# Patient Record
Sex: MALE | Race: WHITE | Employment: OTHER | URBAN - METROPOLITAN AREA
[De-identification: names, ages, dates, MRNs, and addresses within clinical notes are randomized per-mention and may not be internally consistent; named-entity substitution may affect disease eponyms.]

---

## 2017-06-29 PROBLEM — G40.309 GENERALIZED SEIZURE DISORDER (HCC): Status: ACTIVE | Noted: 2017-06-29

## 2017-09-27 PROBLEM — Z86.73 HX-TIA (TRANSIENT ISCHEMIC ATTACK): Status: ACTIVE | Noted: 2017-09-27

## 2017-09-27 PROBLEM — J44.9 COPD (CHRONIC OBSTRUCTIVE PULMONARY DISEASE) (HCC): Status: ACTIVE | Noted: 2017-09-27

## 2017-09-28 PROBLEM — J44.1 CHRONIC OBSTRUCTIVE PULMONARY DISEASE WITH ACUTE EXACERBATION (HCC): Status: ACTIVE | Noted: 2017-09-27

## 2018-05-14 ENCOUNTER — HOSPITAL ENCOUNTER (EMERGENCY)
Age: 63
Discharge: HOME | End: 2018-05-14
Payer: OTHER GOVERNMENT

## 2018-05-14 ENCOUNTER — APPOINTMENT (OUTPATIENT)
Dept: GENERAL RADIOLOGY | Age: 63
End: 2018-05-14
Payer: OTHER GOVERNMENT

## 2018-05-14 VITALS
HEIGHT: 71 IN | SYSTOLIC BLOOD PRESSURE: 103 MMHG | RESPIRATION RATE: 17 BRPM | WEIGHT: 176 LBS | BODY MASS INDEX: 24.64 KG/M2 | DIASTOLIC BLOOD PRESSURE: 87 MMHG | TEMPERATURE: 98.3 F | OXYGEN SATURATION: 97 % | HEART RATE: 53 BPM

## 2018-05-14 DIAGNOSIS — S16.1XXA CERVICAL STRAIN, ACUTE, INITIAL ENCOUNTER: ICD-10-CM

## 2018-05-14 DIAGNOSIS — M54.2 NECK PAIN: ICD-10-CM

## 2018-05-14 DIAGNOSIS — S46.911A SHOULDER STRAIN, RIGHT, INITIAL ENCOUNTER: Primary | ICD-10-CM

## 2018-05-14 PROCEDURE — 99283 EMERGENCY DEPT VISIT LOW MDM: CPT

## 2018-05-14 PROCEDURE — 73030 X-RAY EXAM OF SHOULDER: CPT

## 2018-05-14 PROCEDURE — 72050 X-RAY EXAM NECK SPINE 4/5VWS: CPT

## 2018-05-14 RX ORDER — KETOROLAC TROMETHAMINE 10 MG/1
10 TABLET, FILM COATED ORAL EVERY 6 HOURS PRN
Qty: 20 TABLET | Refills: 0 | Status: SHIPPED | OUTPATIENT
Start: 2018-05-14 | End: 2020-07-23

## 2018-06-12 ENCOUNTER — HOSPITAL ENCOUNTER (EMERGENCY)
Age: 63
Discharge: HOME | End: 2018-06-12
Attending: EMERGENCY MEDICINE
Payer: OTHER GOVERNMENT

## 2018-06-12 ENCOUNTER — APPOINTMENT (OUTPATIENT)
Dept: CT IMAGING | Age: 63
End: 2018-06-12
Payer: OTHER GOVERNMENT

## 2018-06-12 ENCOUNTER — APPOINTMENT (OUTPATIENT)
Dept: GENERAL RADIOLOGY | Age: 63
End: 2018-06-12
Payer: OTHER GOVERNMENT

## 2018-06-12 VITALS
DIASTOLIC BLOOD PRESSURE: 88 MMHG | BODY MASS INDEX: 25.9 KG/M2 | SYSTOLIC BLOOD PRESSURE: 182 MMHG | OXYGEN SATURATION: 97 % | HEART RATE: 78 BPM | TEMPERATURE: 96.3 F | RESPIRATION RATE: 18 BRPM | WEIGHT: 185 LBS | HEIGHT: 71 IN

## 2018-06-12 DIAGNOSIS — N28.1 KIDNEY CYSTS: ICD-10-CM

## 2018-06-12 DIAGNOSIS — E86.0 DEHYDRATION: ICD-10-CM

## 2018-06-12 DIAGNOSIS — R07.89 MUSCULOSKELETAL CHEST PAIN: ICD-10-CM

## 2018-06-12 DIAGNOSIS — R10.84 GENERALIZED ABDOMINAL PAIN: ICD-10-CM

## 2018-06-12 DIAGNOSIS — R19.7 NAUSEA VOMITING AND DIARRHEA: Primary | ICD-10-CM

## 2018-06-12 DIAGNOSIS — R11.2 NAUSEA VOMITING AND DIARRHEA: Primary | ICD-10-CM

## 2018-06-12 DIAGNOSIS — I10 HYPERTENSION, UNSPECIFIED TYPE: ICD-10-CM

## 2018-06-12 LAB
ALBUMIN SERPL-MCNC: 4.7 G/DL (ref 3.5–5.2)
ALP BLD-CCNC: 96 U/L (ref 40–129)
ALT SERPL-CCNC: 20 U/L (ref 0–40)
ANION GAP SERPL CALCULATED.3IONS-SCNC: 16 MMOL/L (ref 7–16)
AST SERPL-CCNC: 18 U/L (ref 0–39)
BASOPHILS ABSOLUTE: 0.05 E9/L (ref 0–0.2)
BASOPHILS RELATIVE PERCENT: 0.3 % (ref 0–2)
BILIRUB SERPL-MCNC: 0.4 MG/DL (ref 0–1.2)
BUN BLDV-MCNC: 27 MG/DL (ref 8–23)
CALCIUM SERPL-MCNC: 10.3 MG/DL (ref 8.6–10.2)
CHLORIDE BLD-SCNC: 106 MMOL/L (ref 98–107)
CO2: 20 MMOL/L (ref 22–29)
CREAT SERPL-MCNC: 1.1 MG/DL (ref 0.7–1.2)
EOSINOPHILS ABSOLUTE: 0.01 E9/L (ref 0.05–0.5)
EOSINOPHILS RELATIVE PERCENT: 0.1 % (ref 0–6)
GFR AFRICAN AMERICAN: >60
GFR NON-AFRICAN AMERICAN: >60 ML/MIN/1.73
GLUCOSE BLD-MCNC: 129 MG/DL (ref 74–109)
HCT VFR BLD CALC: 51.3 % (ref 37–54)
HEMOGLOBIN: 17.6 G/DL (ref 12.5–16.5)
IMMATURE GRANULOCYTES #: 0.09 E9/L
IMMATURE GRANULOCYTES %: 0.6 % (ref 0–5)
LACTIC ACID: 1.5 MMOL/L (ref 0.5–2.2)
LIPASE: 36 U/L (ref 13–60)
LYMPHOCYTES ABSOLUTE: 1.3 E9/L (ref 1.5–4)
LYMPHOCYTES RELATIVE PERCENT: 8.8 % (ref 20–42)
MCH RBC QN AUTO: 28.9 PG (ref 26–35)
MCHC RBC AUTO-ENTMCNC: 34.3 % (ref 32–34.5)
MCV RBC AUTO: 84.4 FL (ref 80–99.9)
MONOCYTES ABSOLUTE: 0.32 E9/L (ref 0.1–0.95)
MONOCYTES RELATIVE PERCENT: 2.2 % (ref 2–12)
NEUTROPHILS ABSOLUTE: 13.08 E9/L (ref 1.8–7.3)
NEUTROPHILS RELATIVE PERCENT: 88 % (ref 43–80)
PDW BLD-RTO: 13.7 FL (ref 11.5–15)
PLATELET # BLD: 304 E9/L (ref 130–450)
PMV BLD AUTO: 10.5 FL (ref 7–12)
POTASSIUM SERPL-SCNC: 3.8 MMOL/L (ref 3.5–5)
RBC # BLD: 6.08 E12/L (ref 3.8–5.8)
SODIUM BLD-SCNC: 142 MMOL/L (ref 132–146)
TOTAL PROTEIN: 7.5 G/DL (ref 6.4–8.3)
TROPONIN: <0.01 NG/ML (ref 0–0.03)
WBC # BLD: 14.9 E9/L (ref 4.5–11.5)

## 2018-06-12 PROCEDURE — 74177 CT ABD & PELVIS W/CONTRAST: CPT

## 2018-06-12 PROCEDURE — 85025 COMPLETE CBC W/AUTO DIFF WBC: CPT

## 2018-06-12 PROCEDURE — 83605 ASSAY OF LACTIC ACID: CPT

## 2018-06-12 PROCEDURE — 99285 EMERGENCY DEPT VISIT HI MDM: CPT

## 2018-06-12 PROCEDURE — 83690 ASSAY OF LIPASE: CPT

## 2018-06-12 PROCEDURE — 2580000003 HC RX 258: Performed by: RADIOLOGY

## 2018-06-12 PROCEDURE — 96375 TX/PRO/DX INJ NEW DRUG ADDON: CPT

## 2018-06-12 PROCEDURE — 96361 HYDRATE IV INFUSION ADD-ON: CPT

## 2018-06-12 PROCEDURE — 96374 THER/PROPH/DIAG INJ IV PUSH: CPT

## 2018-06-12 PROCEDURE — 6360000002 HC RX W HCPCS: Performed by: PHYSICIAN ASSISTANT

## 2018-06-12 PROCEDURE — 84484 ASSAY OF TROPONIN QUANT: CPT

## 2018-06-12 PROCEDURE — 80053 COMPREHEN METABOLIC PANEL: CPT

## 2018-06-12 PROCEDURE — 6360000004 HC RX CONTRAST MEDICATION: Performed by: RADIOLOGY

## 2018-06-12 PROCEDURE — 2580000003 HC RX 258: Performed by: PHYSICIAN ASSISTANT

## 2018-06-12 PROCEDURE — 71045 X-RAY EXAM CHEST 1 VIEW: CPT

## 2018-06-12 PROCEDURE — 36415 COLL VENOUS BLD VENIPUNCTURE: CPT

## 2018-06-12 RX ORDER — 0.9 % SODIUM CHLORIDE 0.9 %
1000 INTRAVENOUS SOLUTION INTRAVENOUS ONCE
Status: COMPLETED | OUTPATIENT
Start: 2018-06-12 | End: 2018-06-12

## 2018-06-12 RX ORDER — METOCLOPRAMIDE HYDROCHLORIDE 5 MG/ML
10 INJECTION INTRAMUSCULAR; INTRAVENOUS ONCE
Status: COMPLETED | OUTPATIENT
Start: 2018-06-12 | End: 2018-06-12

## 2018-06-12 RX ORDER — DICYCLOMINE HYDROCHLORIDE 10 MG/1
20 CAPSULE ORAL
Qty: 56 CAPSULE | Refills: 0 | Status: SHIPPED | OUTPATIENT
Start: 2018-06-12 | End: 2020-07-23

## 2018-06-12 RX ORDER — SODIUM CHLORIDE 0.9 % (FLUSH) 0.9 %
10 SYRINGE (ML) INJECTION
Status: COMPLETED | OUTPATIENT
Start: 2018-06-12 | End: 2018-06-12

## 2018-06-12 RX ORDER — ONDANSETRON 4 MG/1
4 TABLET, FILM COATED ORAL EVERY 8 HOURS PRN
Qty: 12 TABLET | Refills: 0 | Status: SHIPPED | OUTPATIENT
Start: 2018-06-12 | End: 2018-06-17

## 2018-06-12 RX ORDER — MORPHINE SULFATE 4 MG/ML
4 INJECTION, SOLUTION INTRAMUSCULAR; INTRAVENOUS ONCE
Status: COMPLETED | OUTPATIENT
Start: 2018-06-12 | End: 2018-06-12

## 2018-06-12 RX ORDER — FENTANYL CITRATE 50 UG/ML
25 INJECTION, SOLUTION INTRAMUSCULAR; INTRAVENOUS ONCE
Status: COMPLETED | OUTPATIENT
Start: 2018-06-12 | End: 2018-06-12

## 2018-06-12 RX ADMIN — IOPAMIDOL 110 ML: 755 INJECTION, SOLUTION INTRAVENOUS at 13:29

## 2018-06-12 RX ADMIN — FENTANYL CITRATE 25 MCG: 50 INJECTION, SOLUTION INTRAMUSCULAR; INTRAVENOUS at 14:30

## 2018-06-12 RX ADMIN — MORPHINE SULFATE 4 MG: 4 INJECTION INTRAVENOUS at 12:01

## 2018-06-12 RX ADMIN — METOCLOPRAMIDE 10 MG: 5 INJECTION, SOLUTION INTRAMUSCULAR; INTRAVENOUS at 12:01

## 2018-06-12 RX ADMIN — SODIUM CHLORIDE 1000 ML: 9 INJECTION, SOLUTION INTRAVENOUS at 12:02

## 2018-06-12 RX ADMIN — Medication 10 ML: at 13:29

## 2018-06-12 ASSESSMENT — PAIN SCALES - WONG BAKER: WONGBAKER_NUMERICALRESPONSE: 2

## 2018-06-12 ASSESSMENT — PAIN DESCRIPTION - FREQUENCY
FREQUENCY: CONTINUOUS
FREQUENCY: CONTINUOUS

## 2018-06-12 ASSESSMENT — PAIN DESCRIPTION - ONSET
ONSET: SUDDEN
ONSET: SUDDEN

## 2018-06-12 ASSESSMENT — PAIN DESCRIPTION - DESCRIPTORS
DESCRIPTORS: ACHING;BURNING;SHARP;SHOOTING;SORE
DESCRIPTORS: ACHING;SHARP

## 2018-06-12 ASSESSMENT — PAIN DESCRIPTION - PAIN TYPE
TYPE: ACUTE PAIN
TYPE: ACUTE PAIN

## 2018-06-12 ASSESSMENT — PAIN DESCRIPTION - LOCATION
LOCATION: CHEST;ABDOMEN
LOCATION: ABDOMEN

## 2018-06-12 ASSESSMENT — PAIN SCALES - GENERAL
PAINLEVEL_OUTOF10: 8
PAINLEVEL_OUTOF10: 2
PAINLEVEL_OUTOF10: 8
PAINLEVEL_OUTOF10: 6
PAINLEVEL_OUTOF10: 7

## 2018-06-12 ASSESSMENT — PAIN DESCRIPTION - PROGRESSION: CLINICAL_PROGRESSION: NOT CHANGED

## 2018-06-16 LAB
EKG ATRIAL RATE: 72 BPM
EKG P AXIS: 60 DEGREES
EKG P-R INTERVAL: 152 MS
EKG Q-T INTERVAL: 428 MS
EKG QRS DURATION: 106 MS
EKG QTC CALCULATION (BAZETT): 468 MS
EKG R AXIS: 87 DEGREES
EKG T AXIS: 47 DEGREES
EKG VENTRICULAR RATE: 72 BPM

## 2018-09-23 ENCOUNTER — HOSPITAL ENCOUNTER (EMERGENCY)
Age: 63
Discharge: HOME | End: 2018-09-23
Payer: OTHER GOVERNMENT

## 2018-09-23 VITALS
OXYGEN SATURATION: 99 % | HEIGHT: 71 IN | DIASTOLIC BLOOD PRESSURE: 74 MMHG | TEMPERATURE: 97.3 F | RESPIRATION RATE: 16 BRPM | HEART RATE: 87 BPM | WEIGHT: 185 LBS | SYSTOLIC BLOOD PRESSURE: 118 MMHG | BODY MASS INDEX: 25.9 KG/M2

## 2018-09-23 DIAGNOSIS — M54.42 ACUTE LEFT-SIDED LOW BACK PAIN WITH LEFT-SIDED SCIATICA: Primary | ICD-10-CM

## 2018-09-23 PROCEDURE — 6360000002 HC RX W HCPCS: Performed by: PHYSICIAN ASSISTANT

## 2018-09-23 PROCEDURE — 99282 EMERGENCY DEPT VISIT SF MDM: CPT

## 2018-09-23 PROCEDURE — 96372 THER/PROPH/DIAG INJ SC/IM: CPT

## 2018-09-23 RX ORDER — KETOROLAC TROMETHAMINE 30 MG/ML
30 INJECTION, SOLUTION INTRAMUSCULAR; INTRAVENOUS ONCE
Status: COMPLETED | OUTPATIENT
Start: 2018-09-23 | End: 2018-09-23

## 2018-09-23 RX ORDER — ORPHENADRINE CITRATE 30 MG/ML
60 INJECTION INTRAMUSCULAR; INTRAVENOUS ONCE
Status: COMPLETED | OUTPATIENT
Start: 2018-09-23 | End: 2018-09-23

## 2018-09-23 RX ORDER — LIDOCAINE 50 MG/G
1 PATCH TOPICAL EVERY 24 HOURS
Qty: 10 PATCH | Refills: 0 | Status: SHIPPED | OUTPATIENT
Start: 2018-09-23 | End: 2018-10-03

## 2018-09-23 RX ORDER — PREDNISONE 10 MG/1
40 TABLET ORAL DAILY
Qty: 20 TABLET | Refills: 0 | Status: SHIPPED | OUTPATIENT
Start: 2018-09-23 | End: 2018-09-28

## 2018-09-23 RX ADMIN — KETOROLAC TROMETHAMINE 30 MG: 30 INJECTION, SOLUTION INTRAMUSCULAR at 12:13

## 2018-09-23 RX ADMIN — ORPHENADRINE CITRATE 60 MG: 30 INJECTION INTRAMUSCULAR; INTRAVENOUS at 12:13

## 2018-09-23 ASSESSMENT — PAIN DESCRIPTION - DESCRIPTORS: DESCRIPTORS: ACHING

## 2018-09-23 ASSESSMENT — PAIN DESCRIPTION - FREQUENCY: FREQUENCY: CONTINUOUS

## 2018-09-23 ASSESSMENT — PAIN DESCRIPTION - PAIN TYPE: TYPE: CHRONIC PAIN

## 2018-09-23 ASSESSMENT — PAIN DESCRIPTION - PROGRESSION: CLINICAL_PROGRESSION: GRADUALLY WORSENING

## 2018-09-23 ASSESSMENT — PAIN SCALES - GENERAL: PAINLEVEL_OUTOF10: 10

## 2018-09-23 ASSESSMENT — PAIN DESCRIPTION - LOCATION: LOCATION: BACK

## 2018-09-23 NOTE — ED PROVIDER NOTES
drink alcohol or use drugs. Family History: family history includes Seizures in his son; Substance Abuse in his father. Allergies: Patient has no known allergies. Physical Exam           ED Triage Vitals [09/23/18 1115]   BP Temp Temp Source Pulse Resp SpO2 Height Weight   123/75 97.5 °F (36.4 °C) Temporal 89 18 96 % 5' 11\" (1.803 m) 185 lb (83.9 kg)      Oxygen Saturation Interpretation: Normal.    Constitutional:  Alert, development consistent with age. HEENT:  NC/NT. Airway patent. Neck:  Normal ROM. Supple. Respiratory:  Clear to auscultation and breath sounds equal.  CV:  Regular rate and rhythm, normal heart sounds, without pathological murmurs, ectopy, gallops, or rubs. GI:  Abdomen Soft, nontender, good bowel sounds. No firm or pulsatile mass. Back: lower lumbar spine left sided and midline. Tenderness: Moderate. Swelling: no.              Range of Motion: full range with pain. CVA Tenderness: No.            Straight leg raising:  Bilateral negative. Skin:  no erythema, rash or swelling noted. Distal Function:              Motor deficit: none. Sensory deficit: none. Pulse deficit: none. 2+ dorsalis pedis pulses b/l. Calf Tenderness:  No Bilateral.               Edema:  none Both lower extremity(s). Reflexes: Bilateral patellar normal.  Gait:  normal.  Integument:  Normal turgor. Warm, dry, without visible rash. Lymphatics: No lymphangitis or adenopathy noted. Neurological:  Oriented. Motor functions intact. Lab / Imaging Results   (All laboratory and radiology results have been personally reviewed by myself)  Labs:  No results found for this visit on 09/23/18. Imaging: All Radiology results interpreted by Radiologist unless otherwise noted.   No orders to display       ED Course / Medical Decision Making     Medications   ketorolac (TORADOL) injection 30 mg (30 mg Intramuscular Given 9/23/18 1213)   orphenadrine (NORFLEX) injection 60 mg (60 mg Intramuscular Given 9/23/18 1213)        Re-examination:  9/23/18       Time: 1310    Patients symptoms are improving. Reports pain has improved. Consult(s):   None    Procedure(s):   none    Medical Decision Making:    Patient denies any loss of control of his bowels or his bladder, no saddle anesthesia, no Senokot equine's syndrome. He denies any new fall or injury. He states that he has had back pain like this multiple times in the past. Imaging not done at this time. He has good peripheral pulses in his feet. He denies IV drug abuse, fevers, chills, dysuria. No red flag symptoms. Patient reports that this is chronic back pain for him. He did get some relief with the medications given to him here. He is only on gabapentin at home. We'll discharge him at this time. Advised patient to return to the ER for any worsening symptoms. Otherwise to follow-up with PCP. Counseling: The emergency provider has spoken with the patient and discussed todays results, in addition to providing specific details for the plan of care and counseling regarding the diagnosis and prognosis. Questions are answered at this time and they are agreeable with the plan. Assessment      1. Acute left-sided low back pain with left-sided sciatica      Plan   Discharge to home  Patient condition is good    New Medications     Discharge Medication List as of 9/23/2018  1:07 PM      START taking these medications    Details   lidocaine (LIDODERM) 5 % Place 1 patch onto the skin every 24 hours for 10 days 12 hours on, 12 hours off., Disp-10 patch, R-0Print           Electronically signed by SOTO Arias   DD: 9/23/18  **This report was transcribed using voice recognition software. Every effort was made to ensure accuracy; however, inadvertent computerized transcription errors may be present.   END OF ED PROVIDER NOTE       Rosita Aguilar, [address]  09/23/18 0064

## 2018-10-06 ENCOUNTER — HOSPITAL ENCOUNTER (EMERGENCY)
Age: 63
Discharge: HOME | End: 2018-10-06
Payer: OTHER GOVERNMENT

## 2018-10-06 ENCOUNTER — APPOINTMENT (OUTPATIENT)
Dept: GENERAL RADIOLOGY | Age: 63
End: 2018-10-06
Payer: OTHER GOVERNMENT

## 2018-10-06 VITALS
HEART RATE: 89 BPM | WEIGHT: 173 LBS | OXYGEN SATURATION: 97 % | BODY MASS INDEX: 24.22 KG/M2 | DIASTOLIC BLOOD PRESSURE: 95 MMHG | TEMPERATURE: 96.6 F | SYSTOLIC BLOOD PRESSURE: 154 MMHG | RESPIRATION RATE: 17 BRPM | HEIGHT: 71 IN

## 2018-10-06 DIAGNOSIS — M17.12 ARTHRITIS OF KNEE, LEFT: Primary | ICD-10-CM

## 2018-10-06 PROCEDURE — 96372 THER/PROPH/DIAG INJ SC/IM: CPT

## 2018-10-06 PROCEDURE — 99283 EMERGENCY DEPT VISIT LOW MDM: CPT

## 2018-10-06 PROCEDURE — 73564 X-RAY EXAM KNEE 4 OR MORE: CPT

## 2018-10-06 PROCEDURE — 6360000002 HC RX W HCPCS: Performed by: NURSE PRACTITIONER

## 2018-10-06 RX ORDER — IBUPROFEN 800 MG/1
800 TABLET ORAL EVERY 8 HOURS PRN
Qty: 21 TABLET | Refills: 0 | Status: SHIPPED | OUTPATIENT
Start: 2018-10-06 | End: 2020-07-23

## 2018-10-06 RX ORDER — KETOROLAC TROMETHAMINE 30 MG/ML
30 INJECTION, SOLUTION INTRAMUSCULAR; INTRAVENOUS ONCE
Status: COMPLETED | OUTPATIENT
Start: 2018-10-06 | End: 2018-10-06

## 2018-10-06 RX ORDER — PREDNISONE 10 MG/1
40 TABLET ORAL DAILY
Qty: 20 TABLET | Refills: 0 | Status: SHIPPED | OUTPATIENT
Start: 2018-10-06 | End: 2018-10-11

## 2018-10-06 RX ADMIN — KETOROLAC TROMETHAMINE 30 MG: 30 INJECTION, SOLUTION INTRAMUSCULAR at 11:25

## 2018-10-06 ASSESSMENT — PAIN SCALES - GENERAL: PAINLEVEL_OUTOF10: 10

## 2018-10-06 NOTE — ED PROVIDER NOTES
[facility]    Department of Emergency Medicine   ED  Provider Note  Admit Date/RoomTime: 10/6/2018  9:31 AM  ED Room: 37/37  Chief Complaint   Knee Pain (left knee pain x 1 week, thought it was arthiritis, states he had XR yesterday at the [place])    History of Present Illness   Source of history provided by:  patient. History/Exam Limitations: none. Chelsie Phillips is a 61 y.o. old male who has a past medical history of:   Past Medical History:   Diagnosis Date    Back pain, chronic     Brain tumor ([address] unk    Chest pain 9/16/2014    Herniated disc     Marijuana use 9/16/2014    Seizure disorder (HCC)     Seizures (HCC)     Tobacco abuse     Tobacco abuse 9/16/2014    Unspecified cerebral artery occlusion with cerebral infarction     presents to the emergency department by private vehicle, for generalized aching  to left knee  which occured a few day(s) prior to arrival.  The complaint occurred as a result of no trauma  while at home. There has been a history of no prior problems with this area in the past.  Since onset the symptoms have been moderate in degree. His pain is aggraveated by movement and use and relieved by nothing. Tetanus Status: unknown. His weight bearing ability:  Normal. Patient states that he has had several month history of mild pain but states it has been worse the last couple of days. He states he had imaging at the [place] yesterday but has not had results and states he needs to know what is going on. ROS    Pertinent positives and negatives are stated within HPI, all other systems reviewed and are negative. Past Surgical History:   Procedure Laterality Date    BRAIN SURGERY      CERVICAL FUSION      EXTERNAL EAR SURGERY      EYE SURGERY Bilateral     TONSILLECTOMY     Social History:  reports that he has been smoking Cigarettes. He has a 40.00 pack-year smoking history.  He has never used smokeless tobacco. He reports that he does not drink alcohol or use

## 2018-10-08 ENCOUNTER — HOSPITAL ENCOUNTER (EMERGENCY)
Age: 63
Discharge: HOME | End: 2018-10-08
Payer: OTHER GOVERNMENT

## 2018-10-08 VITALS
HEART RATE: 77 BPM | DIASTOLIC BLOOD PRESSURE: 80 MMHG | WEIGHT: 173 LBS | SYSTOLIC BLOOD PRESSURE: 136 MMHG | BODY MASS INDEX: 24.22 KG/M2 | HEIGHT: 71 IN | RESPIRATION RATE: 14 BRPM | TEMPERATURE: 96.8 F | OXYGEN SATURATION: 98 %

## 2018-10-08 DIAGNOSIS — M25.562 ACUTE PAIN OF LEFT KNEE: Primary | ICD-10-CM

## 2018-10-08 PROCEDURE — 6360000002 HC RX W HCPCS: Performed by: NURSE PRACTITIONER

## 2018-10-08 PROCEDURE — 99282 EMERGENCY DEPT VISIT SF MDM: CPT

## 2018-10-08 PROCEDURE — 6370000000 HC RX 637 (ALT 250 FOR IP): Performed by: NURSE PRACTITIONER

## 2018-10-08 PROCEDURE — 96372 THER/PROPH/DIAG INJ SC/IM: CPT

## 2018-10-08 RX ORDER — HYDROCODONE BITARTRATE AND ACETAMINOPHEN 5; 325 MG/1; MG/1
1 TABLET ORAL ONCE
Status: COMPLETED | OUTPATIENT
Start: 2018-10-08 | End: 2018-10-08

## 2018-10-08 RX ORDER — KETOROLAC TROMETHAMINE 30 MG/ML
60 INJECTION, SOLUTION INTRAMUSCULAR; INTRAVENOUS ONCE
Status: COMPLETED | OUTPATIENT
Start: 2018-10-08 | End: 2018-10-08

## 2018-10-08 RX ADMIN — HYDROCODONE BITARTRATE AND ACETAMINOPHEN 1 TABLET: 5; 325 TABLET ORAL at 12:40

## 2018-10-08 RX ADMIN — KETOROLAC TROMETHAMINE 60 MG: 30 INJECTION, SOLUTION INTRAMUSCULAR at 12:40

## 2018-10-08 ASSESSMENT — PAIN DESCRIPTION - DESCRIPTORS: DESCRIPTORS: ACHING

## 2018-10-08 ASSESSMENT — PAIN DESCRIPTION - PAIN TYPE: TYPE: CHRONIC PAIN;ACUTE PAIN

## 2018-10-08 ASSESSMENT — PAIN DESCRIPTION - LOCATION: LOCATION: KNEE

## 2018-10-08 ASSESSMENT — PAIN DESCRIPTION - FREQUENCY: FREQUENCY: CONTINUOUS

## 2018-10-08 ASSESSMENT — PAIN DESCRIPTION - ORIENTATION: ORIENTATION: LEFT

## 2018-10-08 ASSESSMENT — PAIN SCALES - GENERAL
PAINLEVEL_OUTOF10: 10
PAINLEVEL_OUTOF10: 10

## 2018-10-08 ASSESSMENT — PAIN DESCRIPTION - PROGRESSION: CLINICAL_PROGRESSION: GRADUALLY WORSENING

## 2018-10-11 NOTE — ED PROVIDER NOTES
Consult(s):   None    Procedure(s):   none. Medical Decision Making:    Films were not obtained based on negative suspicion for new bony injury as per history/physical findings, no erythema or warmth to suggest septic joint. Plan is subsequently for symptom control, limited use as tolerated and  immobilization [facility] Counseling: The emergency provider has spoken with the patient and spouse/SO and discussed todays results, in addition to providing specific details for the plan of care and counseling regarding the diagnosis and prognosis. Questions are answered at this time and they are agreeable with the plan. Assessment      1. Acute pain of left knee      Plan   Discharge to home  Patient condition is good    New Medications     Discharge Medication List as of 10/8/2018 12:11 PM        Electronically signed by SAI Kerr CNP   DD: 10/11/18  **This report was transcribed using voice recognition software. Every effort was made to ensure accuracy; however, inadvertent computerized transcription errors may be present.   END OF ED PROVIDER NOTE      SAI Oglesby CNP  10/11/18 [id number]

## 2019-01-01 ENCOUNTER — APPOINTMENT (OUTPATIENT)
Dept: GENERAL RADIOLOGY | Age: 64
End: 2019-01-01
Payer: OTHER GOVERNMENT

## 2019-01-01 ENCOUNTER — HOSPITAL ENCOUNTER (EMERGENCY)
Age: 64
Discharge: HOME | End: 2019-01-01
Payer: OTHER GOVERNMENT

## 2019-01-01 VITALS
HEIGHT: 71 IN | OXYGEN SATURATION: 96 % | BODY MASS INDEX: 24.22 KG/M2 | WEIGHT: 173 LBS | RESPIRATION RATE: 16 BRPM | HEART RATE: 93 BPM | TEMPERATURE: 97.8 F

## 2019-01-01 DIAGNOSIS — M25.551 RIGHT HIP PAIN: ICD-10-CM

## 2019-01-01 DIAGNOSIS — M25.511 ACUTE PAIN OF RIGHT SHOULDER: ICD-10-CM
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
## 2019-01-01 PROCEDURE — 73502 X-RAY EXAM HIP UNI 2-3 VIEWS: CPT

## 2019-01-01 PROCEDURE — 6370000000 HC RX 637 (ALT 250 FOR IP): Performed by: PHYSICIAN ASSISTANT

## 2019-01-01 PROCEDURE — 99283 EMERGENCY DEPT VISIT LOW MDM: CPT

## 2019-01-01 PROCEDURE — 73060 X-RAY EXAM OF HUMERUS: CPT

## 2019-01-01 PROCEDURE — 73030 X-RAY EXAM OF SHOULDER: CPT

## 2019-01-01 RX ORDER — HYDROCODONE BITARTRATE AND ACETAMINOPHEN 5; 325 MG/1; MG/1
1 TABLET ORAL ONCE
Status: COMPLETED | OUTPATIENT
Start: 2019-01-01 | End: 2019-01-01

## 2019-01-01 RX ORDER — HYDROCODONE BITARTRATE AND ACETAMINOPHEN 5; 325 MG/1; MG/1
1 TABLET ORAL EVERY 6 HOURS PRN
Qty: 12 TABLET | Refills: 0 | Status: SHIPPED | OUTPATIENT
Start: 2019-01-01 | End: 2019-01-04

## 2019-01-01 RX ADMIN — HYDROCODONE BITARTRATE AND ACETAMINOPHEN 1 TABLET: 5; 325 TABLET ORAL at 11:46

## 2019-01-01 ASSESSMENT — PAIN DESCRIPTION - LOCATION: LOCATION: SHOULDER

## 2019-01-01 ASSESSMENT — PAIN DESCRIPTION - PAIN TYPE: TYPE: ACUTE PAIN

## 2019-01-01 ASSESSMENT — PAIN SCALES - GENERAL: PAINLEVEL_OUTOF10: 9

## 2019-01-01 ASSESSMENT — PAIN DESCRIPTION - DESCRIPTORS: DESCRIPTORS: ACHING

## 2019-01-01 ASSESSMENT — PAIN DESCRIPTION - ORIENTATION: ORIENTATION: RIGHT

## 2019-04-09 ENCOUNTER — HOSPITAL ENCOUNTER (EMERGENCY)
Age: 64
Discharge: HOME | End: 2019-04-09
Attending: EMERGENCY MEDICINE
Payer: OTHER GOVERNMENT

## 2019-04-09 VITALS
DIASTOLIC BLOOD PRESSURE: 90 MMHG | WEIGHT: 173 LBS | TEMPERATURE: 98.8 F | OXYGEN SATURATION: 96 % | BODY MASS INDEX: 24.13 KG/M2 | RESPIRATION RATE: 18 BRPM | HEART RATE: 95 BPM | SYSTOLIC BLOOD PRESSURE: 154 MMHG

## 2019-04-09 DIAGNOSIS — M25.519 SHOULDER PAIN, UNSPECIFIED CHRONICITY, UNSPECIFIED LATERALITY: ICD-10-CM

## 2019-04-09 DIAGNOSIS — G89.18 POSTOPERATIVE PAIN: Primary | ICD-10-CM

## 2019-04-09 PROCEDURE — 99282 EMERGENCY DEPT VISIT SF MDM: CPT

## 2019-04-09 RX ORDER — OXYCODONE HYDROCHLORIDE AND ACETAMINOPHEN 5; 325 MG/1; MG/1
1 TABLET ORAL EVERY 6 HOURS PRN
Qty: 12 TABLET | Refills: 0 | Status: SHIPPED | OUTPATIENT
Start: 2019-04-09 | End: 2019-04-12

## 2019-04-09 ASSESSMENT — PAIN SCALES - GENERAL: PAINLEVEL_OUTOF10: 9

## 2019-04-09 ASSESSMENT — PAIN DESCRIPTION - LOCATION: LOCATION: SHOULDER

## 2019-04-09 ASSESSMENT — PAIN DESCRIPTION - ORIENTATION: ORIENTATION: RIGHT

## 2019-04-09 NOTE — ED PROVIDER NOTES
ED Attending  CC: Chari         Department of Emergency Medicine   ED  Provider Note  Admit Date/RoomTime: 4/9/2019 12:44 PM  ED Room: [place] [address]/  Chief Complaint:   Shoulder Pain (had right rotator cuff surgery on 3/28 at [facility], increasing pain; denies new injury)    History of Present Illness   Source of history provided by:  patient. History/Exam Limitations: none. Jyoti Mathews is a 61 y.o. old male who has a past medical history of:   Past Medical History:   Diagnosis Date    Back pain, chronic     Brain tumor ([address] unk    Chest pain 9/16/2014    Herniated disc     Marijuana use 9/16/2014    Seizure disorder (HCC)     Seizures (HCC)     Tobacco abuse     Tobacco abuse 9/16/2014    Unspecified cerebral artery occlusion with cerebral infarction    presents to the emergency department for c/o R shoulder pain. Reports having R rotator cuff surgery with repair of a biceps tear on 3/28/2019 at [facility]. He reports that he ran out of his percocet 3-4 days ago, he was then ordered Tramadol but he is unable to take as it raises his BP. Describes the pain as a burning sensation and \"spasm\" feeling. Denies injury/trauma. Denies fever, chills, chest pain, SOB, palpitations, lightheadedness, dizziness, syncope, headache, extremity numbness, nausea, vomiting, or diarrhea. He reports that they have been doing dressing changes at home and have not seen any issues which included no redness, swelling or drainage. He is right handed. The patients tetanus status is up to date. Since onset the symptoms have been moderate in degree. His pain is aggraveated by nothing and relieved by nothing. ROS    Pertinent positives and negatives are stated within HPI, all other systems reviewed and are negative. Past Surgical History:  has a past surgical history that includes Eye surgery (Bilateral); Tonsillectomy; External ear surgery; cervical fusion; and brain surgery.   Social History:  reports that he has been smoking cigarettes. He has a 40.00 pack-year smoking history. He has never used smokeless tobacco. He reports that he does not drink alcohol or use drugs. Family History: family history includes Seizures in his son; Substance Abuse in his father. Allergies: Tramadol    Physical Exam           ED Triage Vitals   BP Temp Temp src Pulse Resp SpO2 Height Weight   04/09/19 1242 04/09/19 1242 -- 04/09/19 1150 04/09/19 1242 04/09/19 1150 -- 04/09/19 1242   (!) 154/90 98.8 °F (37.1 °C)  107 18 96 %  173 lb (78.5 kg)      Oxygen Saturation Interpretation: Normal    Constitutional:  Alert, development consistent with age. Neck:  Normal ROM. Supple. Non-tender. Shoulder:  Right anterior, lateral.              Tenderness: mild              Swelling: Mild. Deformity: no.              ROM: in shoulder immobilizer/sling. Skin:  nR shoulder superior, lateral, incisions clean and dry with steri-strips intact. No erythema or drainage present         Neurovascular: Motor deficit: R shoulder immobilizer/sling intact            Sensory deficit: none. Pulse deficit: none. Capillary refill: normal.    Elbow:              Tenderness:  none. Swelling: None. Deformity: no.              ROM: sling intact. Skin:  no erythema, rash or wounds noted. Lymphatics: No lymphangitis or edema noted  Neurological:  Oriented. Motor functions intact. Lab / Imaging Results   (All laboratory and radiology results have been personally reviewed by myself)  Labs:  No results found for this visit on 04/09/19. Imaging: All Radiology results interpreted by Radiologist unless otherwise noted. No orders to display     ED Course / Medical Decision Making   Medications - No data to display       Consult(s):   None    Procedure(s):   none    MDM:   Oliverio Brownlee is a 61 yr old male who presents to the ED for c/o R shoulder pain.  He is s/p R rotator cuff and biceps repair 3/28/19 at [facility]. He has been with out percocet for that past 4 days. He was prescribed Tramadol but he is unable to take it due to elevated BP effects. Denies any injury. Surgical incisions clean dry steri-strips intact. There is minimal swelling and no drainage or erythema. There have been no fever or chills. He was prescribed percocet for 3 days to be taken judiciously and instructed to follow up with [facility]. Advised to return for any new, changing, worsening symptoms or concerns. Counseling: The emergency provider has spoken with the patient and discussed todays results, in addition to providing specific details for the plan of care and counseling regarding the diagnosis and prognosis. Questions are answered at this time and they are agreeable with the plan. Assessment      1. Postoperative pain    2. Shoulder pain, unspecified chronicity, unspecified laterality      Plan   Discharge to home  Patient condition is good    New Medications     Discharge Medication List as of 4/9/2019  1:36 PM      START taking these medications    Details   oxyCODONE-acetaminophen (PERCOCET) 5-325 MG per tablet Take 1 tablet by mouth every 6 hours as needed for Pain for up to 3 days. Intended supply: 3 days. Take lowest dose possible to manage pain, Disp-12 tablet, R-0Print           Electronically signed by SAI Zayas CNP   DD: 4/9/19  **This report was transcribed using voice recognition software. Every effort was made to ensure accuracy; however, inadvertent computerized transcription errors may be present.   END OF ED PROVIDER NOTE        SAI Zayas CNP  04/10/19 [id number]

## 2020-07-23 ENCOUNTER — HOSPITAL ENCOUNTER (INPATIENT)
Age: 65
LOS: 1 days | Discharge: HOME | DRG: 155 | End: 2020-07-24
Attending: EMERGENCY MEDICINE | Admitting: INTERNAL MEDICINE
Payer: OTHER GOVERNMENT

## 2020-07-23 ENCOUNTER — APPOINTMENT (OUTPATIENT)
Dept: CT IMAGING | Age: 65
DRG: 155 | End: 2020-07-23
Payer: OTHER GOVERNMENT

## 2020-07-23 ENCOUNTER — APPOINTMENT (OUTPATIENT)
Dept: GENERAL RADIOLOGY | Age: 65
DRG: 155 | End: 2020-07-23
Payer: OTHER GOVERNMENT

## 2020-07-23 PROBLEM — L03.211 FACIAL CELLULITIS: Status: ACTIVE | Noted: 2020-07-23

## 2020-07-23 LAB
ALBUMIN SERPL-MCNC: 4.3 G/DL (ref 3.5–5.2)
ALP BLD-CCNC: 111 U/L (ref 40–129)
ALT SERPL-CCNC: 17 U/L (ref 0–40)
ANION GAP SERPL CALCULATED.3IONS-SCNC: 12 MMOL/L (ref 7–16)
APTT: 24.3 SEC (ref 24.5–35.1)
AST SERPL-CCNC: 17 U/L (ref 0–39)
BASOPHILS ABSOLUTE: 0.04 E9/L (ref 0–0.2)
BASOPHILS RELATIVE PERCENT: 0.5 % (ref 0–2)
BILIRUB SERPL-MCNC: <0.2 MG/DL (ref 0–1.2)
BUN BLDV-MCNC: 10 MG/DL (ref 8–23)
CALCIUM SERPL-MCNC: 9.8 MG/DL (ref 8.6–10.2)
CHLORIDE BLD-SCNC: 104 MMOL/L (ref 98–107)
CO2: 28 MMOL/L (ref 22–29)
CREAT SERPL-MCNC: 1.1 MG/DL (ref 0.7–1.2)
EKG ATRIAL RATE: 92 BPM
EKG P AXIS: 68 DEGREES
EKG P-R INTERVAL: 168 MS
EKG Q-T INTERVAL: 358 MS
EKG QRS DURATION: 102 MS
EKG QTC CALCULATION (BAZETT): 442 MS
EKG R AXIS: 50 DEGREES
EKG T AXIS: 54 DEGREES
EKG VENTRICULAR RATE: 92 BPM
EOSINOPHILS ABSOLUTE: 0.1 E9/L (ref 0.05–0.5)
EOSINOPHILS RELATIVE PERCENT: 1.3 % (ref 0–6)
GFR AFRICAN AMERICAN: >60
GFR NON-AFRICAN AMERICAN: >60 ML/MIN/1.73
GLUCOSE BLD-MCNC: 86 MG/DL (ref 74–99)
HCT VFR BLD CALC: 41 % (ref 37–54)
HEMOGLOBIN: 13.4 G/DL (ref 12.5–16.5)
IMMATURE GRANULOCYTES #: 0.13 E9/L
IMMATURE GRANULOCYTES %: 1.7 % (ref 0–5)
INR BLD: 1
LACTIC ACID: 1.2 MMOL/L (ref 0.5–2.2)
LYMPHOCYTES ABSOLUTE: 0.63 E9/L (ref 1.5–4)
LYMPHOCYTES RELATIVE PERCENT: 8 % (ref 20–42)
MCH RBC QN AUTO: 31.5 PG (ref 26–35)
MCHC RBC AUTO-ENTMCNC: 32.7 % (ref 32–34.5)
MCV RBC AUTO: 96.5 FL (ref 80–99.9)
MONOCYTES ABSOLUTE: 0.91 E9/L (ref 0.1–0.95)
MONOCYTES RELATIVE PERCENT: 11.6 % (ref 2–12)
NEUTROPHILS ABSOLUTE: 6.06 E9/L (ref 1.8–7.3)
NEUTROPHILS RELATIVE PERCENT: 76.9 % (ref 43–80)
PDW BLD-RTO: 15.1 FL (ref 11.5–15)
PLATELET # BLD: 180 E9/L (ref 130–450)
PMV BLD AUTO: 9.9 FL (ref 7–12)
POTASSIUM REFLEX MAGNESIUM: 3.9 MMOL/L (ref 3.5–5)
PROTHROMBIN TIME: 10.8 SEC (ref 9.3–12.4)
RBC # BLD: 4.25 E12/L (ref 3.8–5.8)
SODIUM BLD-SCNC: 144 MMOL/L (ref 132–146)
TOTAL PROTEIN: 6.5 G/DL (ref 6.4–8.3)
TROPONIN: <0.01 NG/ML (ref 0–0.03)
WBC # BLD: 7.9 E9/L (ref 4.5–11.5)

## 2020-07-23 PROCEDURE — 6360000002 HC RX W HCPCS: Performed by: EMERGENCY MEDICINE

## 2020-07-23 PROCEDURE — 85025 COMPLETE CBC W/AUTO DIFF WBC: CPT

## 2020-07-23 PROCEDURE — 93005 ELECTROCARDIOGRAM TRACING: CPT | Performed by: EMERGENCY MEDICINE

## 2020-07-23 PROCEDURE — 71045 X-RAY EXAM CHEST 1 VIEW: CPT

## 2020-07-23 PROCEDURE — 93010 ELECTROCARDIOGRAM REPORT: CPT | Performed by: INTERNAL MEDICINE

## 2020-07-23 PROCEDURE — 87040 BLOOD CULTURE FOR BACTERIA: CPT

## 2020-07-23 PROCEDURE — 2580000003 HC RX 258: Performed by: INTERNAL MEDICINE

## 2020-07-23 PROCEDURE — 80053 COMPREHEN METABOLIC PANEL: CPT

## 2020-07-23 PROCEDURE — 83605 ASSAY OF LACTIC ACID: CPT

## 2020-07-23 PROCEDURE — 6370000000 HC RX 637 (ALT 250 FOR IP): Performed by: EMERGENCY MEDICINE

## 2020-07-23 PROCEDURE — 6370000000 HC RX 637 (ALT 250 FOR IP): Performed by: INTERNAL MEDICINE

## 2020-07-23 PROCEDURE — 96365 THER/PROPH/DIAG IV INF INIT: CPT

## 2020-07-23 PROCEDURE — 6360000002 HC RX W HCPCS: Performed by: INTERNAL MEDICINE

## 2020-07-23 PROCEDURE — 2580000003 HC RX 258: Performed by: EMERGENCY MEDICINE

## 2020-07-23 PROCEDURE — 84484 ASSAY OF TROPONIN QUANT: CPT

## 2020-07-23 PROCEDURE — 2060000000 HC ICU INTERMEDIATE R&B

## 2020-07-23 PROCEDURE — 36415 COLL VENOUS BLD VENIPUNCTURE: CPT

## 2020-07-23 PROCEDURE — 85610 PROTHROMBIN TIME: CPT

## 2020-07-23 PROCEDURE — 70450 CT HEAD/BRAIN W/O DYE: CPT

## 2020-07-23 PROCEDURE — 99285 EMERGENCY DEPT VISIT HI MDM: CPT

## 2020-07-23 PROCEDURE — 96375 TX/PRO/DX INJ NEW DRUG ADDON: CPT

## 2020-07-23 PROCEDURE — 85730 THROMBOPLASTIN TIME PARTIAL: CPT

## 2020-07-23 RX ORDER — LAMOTRIGINE 100 MG/1
100 TABLET ORAL 2 TIMES DAILY
COMMUNITY

## 2020-07-23 RX ORDER — GABAPENTIN 400 MG/1
800 CAPSULE ORAL 4 TIMES DAILY
Status: DISCONTINUED | OUTPATIENT
Start: 2020-07-23 | End: 2020-07-24 | Stop reason: HOSPADM

## 2020-07-23 RX ORDER — ASPIRIN 81 MG/1
81 TABLET ORAL DAILY
Status: DISCONTINUED | OUTPATIENT
Start: 2020-07-24 | End: 2020-07-24 | Stop reason: HOSPADM

## 2020-07-23 RX ORDER — ONDANSETRON 2 MG/ML
4 INJECTION INTRAMUSCULAR; INTRAVENOUS ONCE
Status: COMPLETED | OUTPATIENT
Start: 2020-07-23 | End: 2020-07-23

## 2020-07-23 RX ORDER — PREDNISOLONE ACETATE 10 MG/ML
1 SUSPENSION/ DROPS OPHTHALMIC 3 TIMES DAILY
COMMUNITY
End: 2022-09-28

## 2020-07-23 RX ORDER — LAMOTRIGINE 25 MG/1
50 TABLET ORAL EVERY MORNING
Status: DISCONTINUED | OUTPATIENT
Start: 2020-07-24 | End: 2020-07-24 | Stop reason: HOSPADM

## 2020-07-23 RX ORDER — OXYCODONE HYDROCHLORIDE AND ACETAMINOPHEN 5; 325 MG/1; MG/1
1 TABLET ORAL EVERY 4 HOURS PRN
Status: DISCONTINUED | OUTPATIENT
Start: 2020-07-23 | End: 2020-07-24 | Stop reason: HOSPADM

## 2020-07-23 RX ORDER — OXYCODONE HYDROCHLORIDE AND ACETAMINOPHEN 5; 325 MG/1; MG/1
2 TABLET ORAL EVERY 4 HOURS PRN
Status: DISCONTINUED | OUTPATIENT
Start: 2020-07-23 | End: 2020-07-24 | Stop reason: HOSPADM

## 2020-07-23 RX ORDER — AMLODIPINE BESYLATE 10 MG/1
10 TABLET ORAL DAILY
Status: ON HOLD | COMMUNITY
End: 2022-09-29 | Stop reason: HOSPADM

## 2020-07-23 RX ORDER — LEVETIRACETAM 250 MG/1
750 TABLET ORAL 2 TIMES DAILY
Status: DISCONTINUED | OUTPATIENT
Start: 2020-07-23 | End: 2020-07-24 | Stop reason: HOSPADM

## 2020-07-23 RX ORDER — PROPRANOLOL HYDROCHLORIDE 20 MG/1
40 TABLET ORAL 2 TIMES DAILY
Status: DISCONTINUED | OUTPATIENT
Start: 2020-07-23 | End: 2020-07-24 | Stop reason: HOSPADM

## 2020-07-23 RX ORDER — PROPRANOLOL HYDROCHLORIDE 40 MG/1
40 TABLET ORAL 2 TIMES DAILY
COMMUNITY
End: 2022-09-28

## 2020-07-23 RX ORDER — POLYETHYLENE GLYCOL 3350 17 G/17G
17 POWDER, FOR SOLUTION ORAL DAILY PRN
Status: DISCONTINUED | OUTPATIENT
Start: 2020-07-23 | End: 2020-07-24 | Stop reason: HOSPADM

## 2020-07-23 RX ORDER — ASPIRIN 81 MG/1
81 TABLET ORAL DAILY
COMMUNITY

## 2020-07-23 RX ORDER — ALBUTEROL SULFATE 2.5 MG/3ML
2.5 SOLUTION RESPIRATORY (INHALATION) EVERY 6 HOURS PRN
Status: DISCONTINUED | OUTPATIENT
Start: 2020-07-23 | End: 2020-07-24 | Stop reason: HOSPADM

## 2020-07-23 RX ORDER — ACETAMINOPHEN 650 MG/1
650 SUPPOSITORY RECTAL EVERY 6 HOURS PRN
Status: DISCONTINUED | OUTPATIENT
Start: 2020-07-23 | End: 2020-07-24 | Stop reason: HOSPADM

## 2020-07-23 RX ORDER — FENTANYL CITRATE 50 UG/ML
50 INJECTION, SOLUTION INTRAMUSCULAR; INTRAVENOUS ONCE
Status: COMPLETED | OUTPATIENT
Start: 2020-07-23 | End: 2020-07-23

## 2020-07-23 RX ORDER — ONDANSETRON 2 MG/ML
4 INJECTION INTRAMUSCULAR; INTRAVENOUS EVERY 6 HOURS PRN
Status: DISCONTINUED | OUTPATIENT
Start: 2020-07-23 | End: 2020-07-24 | Stop reason: HOSPADM

## 2020-07-23 RX ORDER — LAMOTRIGINE 25 MG/1
75 TABLET ORAL NIGHTLY
Status: DISCONTINUED | OUTPATIENT
Start: 2020-07-23 | End: 2020-07-24 | Stop reason: HOSPADM

## 2020-07-23 RX ORDER — 0.9 % SODIUM CHLORIDE 0.9 %
1000 INTRAVENOUS SOLUTION INTRAVENOUS ONCE
Status: COMPLETED | OUTPATIENT
Start: 2020-07-23 | End: 2020-07-23

## 2020-07-23 RX ORDER — GENTAMICIN SULFATE 3 MG/ML
1 SOLUTION/ DROPS OPHTHALMIC 3 TIMES DAILY
Status: DISCONTINUED | OUTPATIENT
Start: 2020-07-23 | End: 2020-07-24 | Stop reason: HOSPADM

## 2020-07-23 RX ORDER — ACETAMINOPHEN 325 MG/1
650 TABLET ORAL EVERY 6 HOURS PRN
Status: DISCONTINUED | OUTPATIENT
Start: 2020-07-23 | End: 2020-07-24 | Stop reason: HOSPADM

## 2020-07-23 RX ORDER — KETOROLAC TROMETHAMINE 10 MG/1
10 TABLET, FILM COATED ORAL EVERY 6 HOURS PRN
COMMUNITY
End: 2022-09-28

## 2020-07-23 RX ORDER — BUDESONIDE AND FORMOTEROL FUMARATE DIHYDRATE 80; 4.5 UG/1; UG/1
2 AEROSOL RESPIRATORY (INHALATION) 2 TIMES DAILY
Status: DISCONTINUED | OUTPATIENT
Start: 2020-07-23 | End: 2020-07-23 | Stop reason: CLARIF

## 2020-07-23 RX ORDER — BUSPIRONE HYDROCHLORIDE 10 MG/1
10 TABLET ORAL 3 TIMES DAILY
COMMUNITY

## 2020-07-23 RX ORDER — DULOXETIN HYDROCHLORIDE 60 MG/1
60 CAPSULE, DELAYED RELEASE ORAL 2 TIMES DAILY
Status: ON HOLD | COMMUNITY
End: 2020-07-24 | Stop reason: SDUPTHER

## 2020-07-23 RX ORDER — BUDESONIDE 0.5 MG/2ML
0.5 INHALANT ORAL 2 TIMES DAILY
Status: DISCONTINUED | OUTPATIENT
Start: 2020-07-23 | End: 2020-07-24 | Stop reason: HOSPADM

## 2020-07-23 RX ORDER — BUSPIRONE HYDROCHLORIDE 10 MG/1
10 TABLET ORAL 3 TIMES DAILY
Status: DISCONTINUED | OUTPATIENT
Start: 2020-07-23 | End: 2020-07-24 | Stop reason: HOSPADM

## 2020-07-23 RX ORDER — PROMETHAZINE HYDROCHLORIDE 25 MG/1
12.5 TABLET ORAL EVERY 6 HOURS PRN
Status: DISCONTINUED | OUTPATIENT
Start: 2020-07-23 | End: 2020-07-24 | Stop reason: HOSPADM

## 2020-07-23 RX ORDER — ARFORMOTEROL TARTRATE 15 UG/2ML
15 SOLUTION RESPIRATORY (INHALATION) 2 TIMES DAILY
Status: DISCONTINUED | OUTPATIENT
Start: 2020-07-23 | End: 2020-07-24 | Stop reason: HOSPADM

## 2020-07-23 RX ORDER — PREDNISOLONE ACETATE 10 MG/ML
1 SUSPENSION/ DROPS OPHTHALMIC 3 TIMES DAILY
Status: DISCONTINUED | OUTPATIENT
Start: 2020-07-23 | End: 2020-07-24 | Stop reason: HOSPADM

## 2020-07-23 RX ORDER — CLONAZEPAM 0.5 MG/1
1 TABLET ORAL 2 TIMES DAILY PRN
Status: DISCONTINUED | OUTPATIENT
Start: 2020-07-23 | End: 2020-07-24 | Stop reason: HOSPADM

## 2020-07-23 RX ORDER — SODIUM CHLORIDE 0.9 % (FLUSH) 0.9 %
10 SYRINGE (ML) INJECTION EVERY 12 HOURS SCHEDULED
Status: DISCONTINUED | OUTPATIENT
Start: 2020-07-23 | End: 2020-07-24 | Stop reason: HOSPADM

## 2020-07-23 RX ORDER — CLONAZEPAM 1 MG/1
1 TABLET ORAL 2 TIMES DAILY PRN
COMMUNITY

## 2020-07-23 RX ORDER — AMLODIPINE BESYLATE 5 MG/1
5 TABLET ORAL DAILY
Status: DISCONTINUED | OUTPATIENT
Start: 2020-07-24 | End: 2020-07-24 | Stop reason: HOSPADM

## 2020-07-23 RX ORDER — DULOXETIN HYDROCHLORIDE 60 MG/1
60 CAPSULE, DELAYED RELEASE ORAL DAILY
Status: DISCONTINUED | OUTPATIENT
Start: 2020-07-23 | End: 2020-07-24 | Stop reason: HOSPADM

## 2020-07-23 RX ORDER — SODIUM CHLORIDE 0.9 % (FLUSH) 0.9 %
10 SYRINGE (ML) INJECTION PRN
Status: DISCONTINUED | OUTPATIENT
Start: 2020-07-23 | End: 2020-07-24 | Stop reason: HOSPADM

## 2020-07-23 RX ADMIN — LAMOTRIGINE 75 MG: 25 TABLET ORAL at 20:26

## 2020-07-23 RX ADMIN — GABAPENTIN 800 MG: 400 CAPSULE ORAL at 20:27

## 2020-07-23 RX ADMIN — GABAPENTIN 800 MG: 400 CAPSULE ORAL at 17:31

## 2020-07-23 RX ADMIN — FENTANYL CITRATE 50 MCG: 50 INJECTION INTRAMUSCULAR; INTRAVENOUS at 13:55

## 2020-07-23 RX ADMIN — CLONAZEPAM 1 MG: 0.5 TABLET ORAL at 21:54

## 2020-07-23 RX ADMIN — OXYCODONE AND ACETAMINOPHEN 2 TABLET: 5; 325 TABLET ORAL at 21:54

## 2020-07-23 RX ADMIN — PROPRANOLOL HYDROCHLORIDE 40 MG: 20 TABLET ORAL at 20:26

## 2020-07-23 RX ADMIN — CEFTRIAXONE SODIUM 1 G: 1 INJECTION, POWDER, FOR SOLUTION INTRAMUSCULAR; INTRAVENOUS at 13:55

## 2020-07-23 RX ADMIN — VANCOMYCIN HYDROCHLORIDE 1000 MG: 1 INJECTION, POWDER, LYOPHILIZED, FOR SOLUTION INTRAVENOUS at 13:56

## 2020-07-23 RX ADMIN — GENTAMICIN SULFATE 1 DROP: 3 SOLUTION/ DROPS OPHTHALMIC at 20:25

## 2020-07-23 RX ADMIN — SODIUM CHLORIDE 1000 ML: 9 INJECTION, SOLUTION INTRAVENOUS at 11:59

## 2020-07-23 RX ADMIN — PREDNISOLONE ACETATE 1 DROP: 10 SUSPENSION/ DROPS OPHTHALMIC at 20:25

## 2020-07-23 RX ADMIN — CEFEPIME HYDROCHLORIDE 2 G: 2 INJECTION, POWDER, FOR SOLUTION INTRAVENOUS at 18:49

## 2020-07-23 RX ADMIN — OXYCODONE AND ACETAMINOPHEN 2 TABLET: 5; 325 TABLET ORAL at 17:32

## 2020-07-23 RX ADMIN — LEVETIRACETAM 750 MG: 250 TABLET ORAL at 20:27

## 2020-07-23 RX ADMIN — SODIUM CHLORIDE 1000 ML: 9 INJECTION, SOLUTION INTRAVENOUS at 15:03

## 2020-07-23 RX ADMIN — DULOXETINE HYDROCHLORIDE 60 MG: 60 CAPSULE, DELAYED RELEASE ORAL at 17:31

## 2020-07-23 RX ADMIN — BUSPIRONE HYDROCHLORIDE 10 MG: 10 TABLET ORAL at 20:26

## 2020-07-23 RX ADMIN — ENOXAPARIN SODIUM 40 MG: 40 INJECTION SUBCUTANEOUS at 17:35

## 2020-07-23 RX ADMIN — ONDANSETRON 4 MG: 2 INJECTION INTRAMUSCULAR; INTRAVENOUS at 13:55

## 2020-07-23 RX ADMIN — ASPIRIN 325 MG: 325 TABLET, COATED ORAL at 14:03

## 2020-07-23 RX ADMIN — BUSPIRONE HYDROCHLORIDE 10 MG: 10 TABLET ORAL at 17:31

## 2020-07-23 ASSESSMENT — PAIN DESCRIPTION - FREQUENCY: FREQUENCY: CONTINUOUS

## 2020-07-23 ASSESSMENT — PAIN SCALES - GENERAL
PAINLEVEL_OUTOF10: 5
PAINLEVEL_OUTOF10: 9
PAINLEVEL_OUTOF10: 5
PAINLEVEL_OUTOF10: 9
PAINLEVEL_OUTOF10: 0
PAINLEVEL_OUTOF10: 7

## 2020-07-23 ASSESSMENT — PAIN DESCRIPTION - ORIENTATION: ORIENTATION: RIGHT

## 2020-07-23 ASSESSMENT — PAIN DESCRIPTION - PROGRESSION: CLINICAL_PROGRESSION: GRADUALLY IMPROVING

## 2020-07-23 ASSESSMENT — PAIN DESCRIPTION - PAIN TYPE: TYPE: ACUTE PAIN

## 2020-07-23 NOTE — ED PROVIDER NOTES
HPI:  7/23/20,   Time: 11:54 AM EDT       Nithya Brady is a 59 y.o. male presenting to the ED for right ear/face redness, and cp, beginning 3 hrs ago. The complaint has been intermittent cp, but redness constant, moderate in severity, and worsened by nothing. Right facial pain and redness started this am, no hx same, had first chemo yesterday for lymphoma. Also this am had intermittent cp, felt lke indigestion, resolved now. Hx same. Nothing seems to bring on . No fever, chils or sweats. No n/v/d/sob/cough    Review of Systems:   Pertinent positives and negatives are stated within HPI, all other systems reviewed and are negative.          --------------------------------------------- PAST HISTORY ---------------------------------------------  Past Medical History:  has a past medical history of Back pain, chronic, Brain tumor ([facility] [address] Chest pain, Herniated disc, Marijuana use, Seizure disorder ([facility] [address] Seizures ([facility] [address] Tobacco abuse, Tobacco abuse, and Unspecified cerebral artery occlusion with cerebral infarction. Past Surgical History:  has a past surgical history that includes Eye surgery (Bilateral); Tonsillectomy; External ear surgery; cervical fusion; and brain surgery. Social History:  reports that he has been smoking cigarettes. He has a 20.00 pack-year smoking history. He has never used smokeless tobacco. He reports that he does not drink alcohol or use drugs. Family History: family history includes Seizures in his son; Substance Abuse in his father. The patients home medications have been reviewed. Allergies: Tramadol        ---------------------------------------------------PHYSICAL EXAM--------------------------------------    Constitutional/General: Alert and oriented x3, well appearing, non toxic in NAD  Head: right ear erythema and right mastoid ttp, redness surrounds ear.   Right tm and canal nml  Eyes: PERRL, EOMI, conjunctive normal, sclera non icteric  Mouth: Oropharynx 0. 91 0.10 - 0.95 E9/L    Eosinophils Absolute 0.10 0.05 - 0.50 E9/L    Basophils Absolute 0.04 0.00 - 0.20 E9/L   Comprehensive Metabolic Panel w/ Reflex to MG   Result Value Ref Range    Sodium 144 132 - 146 mmol/L    Potassium reflex Magnesium 3.9 3.5 - 5.0 mmol/L    Chloride 104 98 - 107 mmol/L    CO2 28 22 - 29 mmol/L    Anion Gap 12 7 - 16 mmol/L    Glucose 86 74 - 99 mg/dL    BUN 10 8 - 23 mg/dL    CREATININE 1.1 0.7 - 1.2 mg/dL    GFR Non-African American >60 >=60 mL/min/1.73    GFR African American >60     Calcium 9.8 8.6 - 10.2 mg/dL    Total Protein 6.5 6.4 - 8.3 g/dL    Alb 4.3 3.5 - 5.2 g/dL    Total Bilirubin <0.2 0.0 - 1.2 mg/dL    Alkaline Phosphatase 111 40 - 129 U/L    ALT 17 0 - 40 U/L    AST 17 0 - 39 U/L   Troponin   Result Value Ref Range    Troponin <0.01 0.00 - 0.03 ng/mL   Protime-INR   Result Value Ref Range    Protime 10.8 9.3 - 12.4 sec    INR 1.0    APTT   Result Value Ref Range    aPTT 24.3 (L) 24.5 - 35.1 sec   Lactic Acid, Plasma   Result Value Ref Range    Lactic Acid 1.2 0.5 - 2.2 mmol/L   Troponin   Result Value Ref Range    Troponin <0.01 0.00 - 0.03 ng/mL   EKG 12 Lead   Result Value Ref Range    Ventricular Rate 92 BPM    Atrial Rate 92 BPM    P-R Interval 168 ms    QRS Duration 102 ms    Q-T Interval 358 ms    QTc Calculation (Bazett) 442 ms    P Axis 68 degrees    R Axis 50 degrees    T Axis 54 degrees       RADIOLOGY:  Interpreted by Radiologist.  CT Head WO Contrast   Final Result   There appears to been prior mastoidectomy. There is a small amount of   residual mucosal thickening in mastoid air cells posteriorly. Infiltration about the pinna the right ear and adjacent subcutaneous   fat and scalp presumably is inflammatory and postoperative. Some soft tissue prominence in the right middle ear cavity. Is   uncertain whether this is postoperative. Tumor is not excluded.    Recommend MRI of the skull base without and with contrast      XR CHEST PORTABLE   Final Result   No acute cardiopulmonary abnormality. EKG: This EKG is signed and interpreted by the EP. Time: 1135  Rate: 90  Rhythm: Sinus  Interpretation: non-specific EKG  Comparison: None      ------------------------- NURSING NOTES AND VITALS REVIEWED ---------------------------   The nursing notes within the ED encounter and vital signs as below have been reviewed by myself. /74   Pulse 101   Temp 98.1 °F (36.7 °C) (Temporal)   Resp 18   Ht 6' (1.829 m)   Wt 205 lb (93 kg)   SpO2 95%   BMI 27.80 kg/m²   Oxygen Saturation Interpretation: Normal    The patients available past medical records and past encounters were reviewed.         ------------------------------ ED COURSE/MEDICAL DECISION MAKING----------------------  Medications   albuterol (PROVENTIL) nebulizer solution 2.5 mg (has no administration in time range)   amLODIPine (NORVASC) tablet 5 mg (has no administration in time range)   aspirin EC tablet 81 mg (has no administration in time range)   busPIRone (BUSPAR) tablet 10 mg (10 mg Oral Given 7/23/20 2026)   clonazePAM (KLONOPIN) tablet 1 mg (has no administration in time range)   DULoxetine (CYMBALTA) extended release capsule 60 mg (60 mg Oral Given 7/23/20 1731)   gabapentin (NEURONTIN) capsule 800 mg (800 mg Oral Given 7/23/20 2027)   levETIRAcetam (KEPPRA) tablet 750 mg (750 mg Oral Given 7/23/20 2027)   prednisoLONE acetate (PRED FORTE) 1 % ophthalmic suspension 1 drop (1 drop Both Eyes Given 7/23/20 2025)   propranolol (INDERAL) tablet 40 mg (40 mg Oral Given 7/23/20 2026)   sodium chloride flush 0.9 % injection 10 mL (10 mLs Intravenous Not Given 7/23/20 2029)   sodium chloride flush 0.9 % injection 10 mL (has no administration in time range)   acetaminophen (TYLENOL) tablet 650 mg (has no administration in time range)     Or   acetaminophen (TYLENOL) suppository 650 mg (has no administration in time range)   polyethylene glycol (GLYCOLAX) packet 17 g (has no administration in time range)   promethazine (PHENERGAN) tablet 12.5 mg (has no administration in time range)     Or   ondansetron (ZOFRAN) injection 4 mg (has no administration in time range)   enoxaparin (LOVENOX) injection 40 mg (40 mg Subcutaneous Given 7/23/20 1735)   budesonide (PULMICORT) nebulizer suspension 500 mcg (has no administration in time range)     And   Arformoterol Tartrate (BROVANA) nebulizer solution 15 mcg (has no administration in time range)   cefepime (MAXIPIME) 2 g IVPB extended (mini-bag) (2 g Intravenous New Bag 7/23/20 1849)     And   Saline flush for cefepime (has no administration in time range)   vancomycin 1.5 g in dextrose 5% 300 mL IVPB (has no administration in time range)   oxyCODONE-acetaminophen (PERCOCET) 5-325 MG per tablet 1 tablet ( Oral See Alternative 7/23/20 1732)     Or   oxyCODONE-acetaminophen (PERCOCET) 5-325 MG per tablet 2 tablet (2 tablets Oral Given 7/23/20 1732)   gentamicin (GARAMYCIN) 0.3 % ophthalmic solution 1 drop (1 drop Both Eyes Given 7/23/20 2025)   lamoTRIgine (LAMICTAL) tablet 50 mg (has no administration in time range)   lamoTRIgine (LAMICTAL) tablet 75 mg (75 mg Oral Given 7/23/20 2026)   0.9 % sodium chloride bolus (0 mLs Intravenous Stopped 7/23/20 1335)   vancomycin 1000 mg IVPB in 250 mL D5W addavial (0 mg Intravenous Stopped 7/23/20 1700)   cefTRIAXone (ROCEPHIN) 1 g in sterile water 10 mL IV syringe (1 g Intravenous New Bag 7/23/20 1355)   aspirin EC tablet 325 mg (325 mg Oral Given 7/23/20 1403)   fentaNYL (SUBLIMAZE) injection 50 mcg (50 mcg Intravenous Given 7/23/20 1355)   ondansetron (ZOFRAN) injection 4 mg (4 mg Intravenous Given 7/23/20 1355)   0.9 % sodium chloride bolus (0 mLs Intravenous Stopped 7/23/20 1730)         ED COURSE:       Medical Decision Making:    Results noted, pt reports ear surgery 2011 for tumor. cellulits right ear clinically, abx ordered.  Also cp with risk factors, will admit for further care      This patient's ED course included: a

## 2020-07-23 NOTE — CONSULTS
OTOLARYNGOLOGY  CONSULT NOTE  7/23/2020    Physician Consulted: Dr. Rebeca Cat  Reason for Consult: facial swelling  Referring Physician: Dr. Yony WOOD  Nicky Burciaga is a 59 y.o. male who presents for evaluation of facial ear swelling. Patient has hx of lymphoma currently undergoing chemotherapy on 4th treatment, hx of R brain tumor s/p resection [id number] w/ posterior auricular/mastoid approach and complains of facial pain swelling and redness for the past 3 days. Patient reports he first noted pain on the right side of his head described as a sharp stabbing pain. Then the next day had onset of redness over the right ear, right posterior auricular region, extending just anterior to the right ear with mild swelling. He states this area is painful to light tough, controlled with gabapentin and swelling has improved since onset. Denies fever, chills, otorrhea, change in hearing, change in vision. Patient did receive the shingles vaccine        Past Medical History:   Diagnosis Date    Back pain, chronic     Brain tumor ([address] unk    Chest pain 9/16/2014    Herniated disc     Marijuana use 9/16/2014    Seizure disorder (HCC)     Seizures (HCC)     Tobacco abuse     Tobacco abuse 9/16/2014    Unspecified cerebral artery occlusion with cerebral infarction        Past Surgical History:   Procedure Laterality Date    BRAIN SURGERY      CERVICAL FUSION      EXTERNAL EAR SURGERY      EYE SURGERY Bilateral     TONSILLECTOMY         Medications Prior to Admission:    Prior to Admission medications    Medication Sig Start Date End Date Taking? Authorizing Provider   tobramycin-dexamethasone (TOBRADEX) 0.3-0.1 % ophthalmic ointment Place into both eyes nightly   Yes Historical Provider, MD   prednisoLONE acetate (PRED FORTE) 1 % ophthalmic suspension Place 1 drop into both eyes 3 times daily   Yes Historical Provider, MD   clonazePAM (KLONOPIN) 1 MG tablet Take 1 mg by mouth 2 times daily as needed.    Yes Historical Provider, MD   ketorolac (TORADOL) 10 MG tablet Take 10 mg by mouth every 6 hours as needed for Pain   Yes Historical Provider, MD   propranolol (INDERAL) 40 MG tablet Take 40 mg by mouth 2 times daily   Yes Historical Provider, MD   amLODIPine (NORVASC) 5 MG tablet Take 5 mg by mouth daily   Yes Historical Provider, MD   busPIRone (BUSPAR) 10 MG tablet Take 10 mg by mouth 3 times daily   Yes Historical Provider, MD   DULoxetine (CYMBALTA) 60 MG extended release capsule Take 60 mg by mouth 2 times daily   Yes Historical Provider, MD   lamoTRIgine (LAMICTAL) 25 MG tablet Take 25 mg by mouth See Admin Instructions 50 mg - AM  75 mg - PM   Yes Historical Provider, MD   aspirin 81 MG EC tablet Take 81 mg by mouth daily   Yes Historical Provider, MD   albuterol (PROVENTIL) (5 MG/ML) 0.5% nebulizer solution Take 0.5 mLs by nebulization every 6 hours as needed for Wheezing 10/4/17  Yes SAI Ndiaye - KESHAV   fluticasone-vilanterol (BREO ELLIPTA) 100-25 MCG/INH AEPB inhaler Inhale 1 puff into the lungs daily 9/28/17  Yes Bel Avila MD   albuterol sulfate HFA (PROAIR HFA) 108 (90 Base) MCG/ACT inhaler Inhale 2 puffs into the lungs every 6 hours as needed for Wheezing 9/28/17  Yes Bel Avila MD   levETIRAcetam (KEPPRA) 750 MG tablet Take 1 tablet by mouth 2 times daily 6/26/17  Yes Caio Carlson MD   gabapentin (NEURONTIN) 800 MG tablet Take 800 mg by mouth 4 times daily.     Yes Historical Provider, MD   vardenafil (LEVITRA) 20 MG tablet Take 20 mg by mouth as needed for Erectile Dysfunction    Historical Provider, MD       Allergies   Allergen Reactions    Tramadol      \"blood pressure spikes\"       Family History   Problem Relation Age of Onset    Substance Abuse Father         alcohol    Seizures Son        Social History     Tobacco Use    Smoking status: Current Every Day Smoker     Packs/day: 0.50     Years: 40.00     Pack years: 20.00     Types: Cigarettes    Smokeless tobacco: Never Used   Substance Use Topics    Alcohol use: No     Comment: occasional    Drug use: No     Types: Marijuana         Review of Systems   General ROS: negative  Hematological and Lymphatic ROS: negative  Respiratory ROS: no cough, shortness of breath, or wheezing  Cardiovascular ROS: no chest pain or dyspnea on exertion  Gastrointestinal ROS: no abdominal pain, change in bowel habits, or black or bloody stools  Genito-Urinary ROS: no dysuria, trouble voiding, or hematuria  Musculoskeletal ROS: negative      PHYSICAL EXAM:    Vitals:    20 1615   BP: (!) 165/98   Pulse: 95   Resp: 18   Temp: 97.4 °F (36.3 °C)   SpO2: 95%       General Appearance:  Laying bed, awake, alert, no apparent distress   Head/face:  NC/AT  Eyes: PERRL, EOMI  ENT: vesicular changes over the auricle of the right ear and posterior auricular region, erythema, mild swelling over the posterior auricular region, ear and periauricular region in the CN V2/V3 region. CN V and VII intact equal bilaterally. Right ear canal is erythematous without vesicles or swelling, TM intact. Oral cavity clear, nares with bilateral congestion L >R, Left external ear, canal and TM intact. Neck soft nontender    Lungs:  Non-labored, good respiratory effort, no stridor  Heart:  RR                LABS:    CBC  Recent Labs     20  1156   WBC 7.9   HGB 13.4   HCT 41.0        BMP  Recent Labs     20  1156      K 3.9      CO2 28   BUN 10   CREATININE 1.1   CALCIUM 9.8     COAG's  Recent Labs     20  1156   INR 1.0     CALCIUM  Recent Labs     20  1156   CALCIUM 9.8     PTH  No results for input(s): PTH in the last 72 hours. RADIOLOGY    Ct Head Wo Contrast    Result Date: 2020  Patient MRN:  [id number] : 1955 Age: 59 years Gender: Male Order Date:  2020 12:00 PM EXAM: CT HEAD WO CONTRAST Technique: Low-dose CT  acquisition technique included one of following options; 1 . Automated exposure control, 2. Adjustment of MA and or KV according to patient's size. 3. Use of interactive reconstruction. Dosage: 1396 mGy-cm. INDICATION:  right ear redness/mastoid tenderness right ear redness/mastoid tenderness COMPARISON: None FINDINGS:  There is a small old infarction in the left periventricular white matter. There is no mass effect, edema or hemorrhage. There is edema about the pinna the right ear. There is skin thickening. There is some infiltration in the adjacent scalp. There is some infiltration in the adjacent subcutaneous fat. Visualized portions of the right parotid gland are normal. There appears to been prior resection of much of the right mastoid air cells. There is small amount mucosal thickening posteriorly. There is some soft tissue prominence in the region of the right middle ear cavity medially (series 2 image 7). There appears to been prior mastoidectomy. There is a small amount of residual mucosal thickening in mastoid air cells posteriorly. Infiltration about the pinna the right ear and adjacent subcutaneous fat and scalp presumably is inflammatory and postoperative. Some soft tissue prominence in the right middle ear cavity. Is uncertain whether this is postoperative. Tumor is not excluded. Recommend MRI of the skull base without and with contrast    Xr Chest Portable    Result Date: 2020  Patient MRN: [id number] : 1955 Age:  59 years Gender: Male Order Date: 2020 12:00 PM Exam: XR CHEST PORTABLE Indication:   cp cp Comparison: Chest radiograph,  2018 FINDINGS: The lungs are clear. The cardiomediastinal contour is unremarkable. No pneumothorax or pleural effusion. Evaluation of the bones reveals no fracture or destructive lesion. Tip of the right chest wall port-a-cath overlies the cavoatrial junction. No acute cardiopulmonary abnormality. ASSESSMENT:  59 y.o. male with hx of lymphoma on 4th round of treatment appearing with painful rash on the right side of face.  My assessment is Shingles in the V2/V3 distribution of the right face. PLAN:  -Decadron 10mg [id number] for 3 doses   -course of Acyclovir  -ciprodex drops: 3 drops to right ear 3 times per day for 7 days  -follow up outpatient as needed    Patient seen and discussed with Attending.         Electronically signed by Sagrario Bailey DO on 7/23/20 at 6:19 PM EDT

## 2020-07-23 NOTE — H&P
Hospital Medicine History & Physical      PCP: SAI Seymour CNP    Date of Admission: 7/23/2020    Date of Service: Pt seen/examined on 7/23/20 and Admitted to Inpatient with expected LOS greater than two midnights due to medical therapy. Chief Complaint:  Right head/face pain      History Of Present Illness:       59 y.o. male who presented to [facility] with right side head/face pain. History obtained from the patient. He states he developed right side head/face pain approximately three days ago. He states the pain is 10/10, nonradiating. He states he is having difficulty hearing out of the right ear, which is new for him. He did go to [facility] clinic today for exam, was sent to ED for evaluation. While in the ED, he did develop chest pain that resolved with supplemental oxygen. He described the chest pain as indigestion, non-radiating and only lasting a few minutes. He states he has not had any chest pain since. The right ear is warm to touch, red and swollen. He has PMH of brain tumor, seizure disorder, CVA. He did start chemo this morning for lymphoma. He is a current smoker and admits to social drinking. Past Medical History:          Diagnosis Date    Back pain, chronic     [facility] ([facility] unk    Chest pain 9/16/2014    Herniated disc     Marijuana use 9/16/2014    Seizure disorder (HCC)     Seizures (HCC)     Tobacco abuse     Tobacco abuse 9/16/2014    Unspecified cerebral artery occlusion with cerebral infarction        Past Surgical History:          Procedure Laterality Date    BRAIN SURGERY      CERVICAL FUSION      EXTERNAL EAR SURGERY      EYE SURGERY Bilateral     TONSILLECTOMY         Medications Prior to Admission:      Prior to Admission medications    Medication Sig Start Date End Date Taking?  Authorizing Provider   tobramycin-dexamethasone (TOBRADEX) 0.3-0.1 % ophthalmic ointment Place into both eyes nightly   Yes Historical Provider, MD   prednisoLONE acetate (PRED FORTE) 1 % ophthalmic suspension Place 1 drop into both eyes 3 times daily   Yes Historical Provider, MD   clonazePAM (KLONOPIN) 1 MG tablet Take 1 mg by mouth 2 times daily as needed. Yes Historical Provider, MD   ketorolac (TORADOL) 10 MG tablet Take 10 mg by mouth every 6 hours as needed for Pain   Yes Historical Provider, MD   propranolol (INDERAL) 40 MG tablet Take 40 mg by mouth 2 times daily   Yes Historical Provider, MD   amLODIPine (NORVASC) 5 MG tablet Take 5 mg by mouth daily   Yes Historical Provider, MD   busPIRone (BUSPAR) 10 MG tablet Take 10 mg by mouth 3 times daily   Yes Historical Provider, MD   DULoxetine (CYMBALTA) 60 MG extended release capsule Take 60 mg by mouth 2 times daily   Yes Historical Provider, MD   lamoTRIgine (LAMICTAL) 25 MG tablet Take 25 mg by mouth See Admin Instructions 50 mg - AM  75 mg - PM   Yes Historical Provider, MD   aspirin 81 MG EC tablet Take 81 mg by mouth daily   Yes Historical Provider, MD   albuterol (PROVENTIL) (5 MG/ML) 0.5% nebulizer solution Take 0.5 mLs by nebulization every 6 hours as needed for Wheezing 10/4/17  Yes Ovidio Hooks, SAI - CNP   fluticasone-vilanterol (BREO ELLIPTA) 100-25 MCG/INH AEPB inhaler Inhale 1 puff into the lungs daily 9/28/17  Yes Kyaw Valle MD   albuterol sulfate HFA (PROAIR HFA) 108 (90 Base) MCG/ACT inhaler Inhale 2 puffs into the lungs every 6 hours as needed for Wheezing 9/28/17  Yes Kyaw Valle MD   levETIRAcetam (KEPPRA) 750 MG tablet Take 1 tablet by mouth 2 times daily 6/26/17  Yes Johnny Padilla., MD   gabapentin (NEURONTIN) 800 MG tablet Take 800 mg by mouth 4 times daily. Yes Historical Provider, MD   vardenafil (LEVITRA) 20 MG tablet Take 20 mg by mouth as needed for Erectile Dysfunction    Historical Provider, MD       Allergies:  Tramadol    Social History:      The patient currently lives at home. TOBACCO:   reports that he has been smoking cigarettes.  He has a 20.00 pack-year smoking history. He has never used smokeless tobacco.  ETOH:   reports no history of alcohol use. Family History:      Positive as follows:        Problem Relation Age of Onset    Substance Abuse Father         alcohol    Seizures Son        REVIEW OF SYSTEMS:   Pertinent positives as noted in the HPI. All other systems reviewed and negative. PHYSICAL EXAM:    BP (!) 146/78   Pulse 105   Temp 97.7 °F (36.5 °C) (Temporal)   Resp 19   Ht 6' (1.829 m)   Wt 205 lb (93 kg)   SpO2 98%   BMI 27.80 kg/m²     General appearance:  No apparent distress, appears stated age and cooperative. HEENT:  Normal cephalic, atraumatic without obvious deformity. Pupils equal, round, and reactive to light. Extra ocular muscles intact. Conjunctivae/corneas clear. Right ear red, swollen, warm to touch  Neck: Supple, with full range of motion. No jugular venous distention. Trachea midline. Respiratory:  Normal respiratory effort. Clear to auscultation, bilaterally without Rales/Wheezes/Rhonchi. Cardiovascular:  Regular rate and rhythm with normal S1/S2 without murmurs, rubs or gallops. Abdomen: Soft, non-tender, non-distended with normal bowel sounds. Musculoskeletal:  No clubbing, cyanosis or edema bilaterally. Full range of motion without deformity. Skin: Skin color, texture, turgor normal.  No rashes or lesions. Neurologic:  Neurovascularly intact without any focal sensory/motor deficits.    Psychiatric:  Alert and oriented, thought content appropriate, normal insight  Capillary Refill: Brisk,< 3 seconds   Peripheral Pulses: +2 palpable, equal bilaterally         EKG:  I have reviewed the EKG with the following interpretation: NSR with PVCs    Labs:     Recent Labs     07/23/20  1156   WBC 7.9   HGB 13.4   HCT 41.0        Recent Labs     07/23/20  1156      K 3.9      CO2 28   BUN 10   CREATININE 1.1   CALCIUM 9.8     Recent Labs     07/23/20  1156   AST 17   ALT 17   BILITOT <0.2 ALKPHOS 111     Recent Labs     07/23/20  1156   INR 1.0     Recent Labs     07/23/20  1156   TROPONINI <0.01       Urinalysis:      Lab Results   Component Value Date    NITRU NEGATIVE 08/21/2011    WBCUA 10-20 08/21/2011    BACTERIA NONE 08/21/2011    RBCUA 0-1 08/21/2011    BLOODU NEGATIVE 08/21/2011    SPECGRAV >=1.030 08/21/2011    GLUCOSEU NEGATIVE 08/21/2011     CT Head WO Contrast   Final Result   There appears to been prior mastoidectomy. There is a small amount of   residual mucosal thickening in mastoid air cells posteriorly. Infiltration about the pinna the right ear and adjacent subcutaneous   fat and scalp presumably is inflammatory and postoperative. Some soft tissue prominence in the right middle ear cavity. Is   uncertain whether this is postoperative. Tumor is not excluded. Recommend MRI of the skull base without and with contrast      XR CHEST PORTABLE   Final Result   No acute cardiopulmonary abnormality. ASSESSMENT:    Active Hospital Problems    Diagnosis Date Noted    Facial cellulitis [A97.252] 07/23/2020    Chronic obstructive pulmonary disease with acute exacerbation ([facility] [J44.1] 09/27/2017    Hx-TIA (transient ischemic attack) [Z86.73] 09/27/2017    Tobacco abuse [Z72.0] 09/16/2014    Hypertension [I10] 08/21/2011    Seiz[facility] disorder ([facility] [G40.909] 08/21/2011       PLAN:  Resume home medications  Consult ENT  Stress test  Daily weights  Neurovascular checks  I/Os  Monitor BP  Supplemental oxygen PRN  IV antibiotics-cefepime and vanc  Nebulizer        DVT Prophylaxis: lovenox  Diet: No diet orders on file  Code Status: Prior    PT/OT Eval Status: n/a    Dispo - admit telemetry       Chava Page CNP    Thank you SAI Edwards CNP for the opportunity to be involved in this patient's care. If you have any questions or concerns please feel free to contact me via [address] [place] [address] Avenue.

## 2020-07-24 VITALS
RESPIRATION RATE: 18 BRPM | HEART RATE: 101 BPM | DIASTOLIC BLOOD PRESSURE: 81 MMHG | SYSTOLIC BLOOD PRESSURE: 134 MMHG | HEIGHT: 72 IN | WEIGHT: 197.1 LBS | OXYGEN SATURATION: 94 % | TEMPERATURE: 98.2 F | BODY MASS INDEX: 26.7 KG/M2

## 2020-07-24 LAB
ALBUMIN SERPL-MCNC: 3.5 G/DL (ref 3.5–5.2)
ALP BLD-CCNC: 85 U/L (ref 40–129)
ALT SERPL-CCNC: 15 U/L (ref 0–40)
ANION GAP SERPL CALCULATED.3IONS-SCNC: 12 MMOL/L (ref 7–16)
AST SERPL-CCNC: 16 U/L (ref 0–39)
BILIRUB SERPL-MCNC: 0.3 MG/DL (ref 0–1.2)
BUN BLDV-MCNC: 11 MG/DL (ref 8–23)
CALCIUM SERPL-MCNC: 8.7 MG/DL (ref 8.6–10.2)
CHLORIDE BLD-SCNC: 98 MMOL/L (ref 98–107)
CHOLESTEROL, TOTAL: 167 MG/DL (ref 0–199)
CO2: 26 MMOL/L (ref 22–29)
CREAT SERPL-MCNC: 1.2 MG/DL (ref 0.7–1.2)
EKG ATRIAL RATE: 89 BPM
EKG P AXIS: 51 DEGREES
EKG P-R INTERVAL: 162 MS
EKG Q-T INTERVAL: 366 MS
EKG QRS DURATION: 102 MS
EKG QTC CALCULATION (BAZETT): 445 MS
EKG R AXIS: 25 DEGREES
EKG T AXIS: 45 DEGREES
EKG VENTRICULAR RATE: 89 BPM
GFR AFRICAN AMERICAN: >60
GFR NON-AFRICAN AMERICAN: >60 ML/MIN/1.73
GLUCOSE BLD-MCNC: 102 MG/DL (ref 74–99)
HBA1C MFR BLD: 5.9 % (ref 4–5.6)
HCT VFR BLD CALC: 38.1 % (ref 37–54)
HDLC SERPL-MCNC: 28 MG/DL
HEMOGLOBIN: 12.4 G/DL (ref 12.5–16.5)
LDL CHOLESTEROL CALCULATED: 80 MG/DL (ref 0–99)
MAGNESIUM: 1.8 MG/DL (ref 1.6–2.6)
MCH RBC QN AUTO: 32 PG (ref 26–35)
MCHC RBC AUTO-ENTMCNC: 32.5 % (ref 32–34.5)
MCV RBC AUTO: 98.2 FL (ref 80–99.9)
PDW BLD-RTO: 15.4 FL (ref 11.5–15)
PHOSPHORUS: 3.1 MG/DL (ref 2.5–4.5)
PLATELET # BLD: 132 E9/L (ref 130–450)
PMV BLD AUTO: 10.1 FL (ref 7–12)
POTASSIUM SERPL-SCNC: 3.6 MMOL/L (ref 3.5–5)
PROCALCITONIN: 0.19 NG/ML (ref 0–0.08)
RBC # BLD: 3.88 E12/L (ref 3.8–5.8)
SODIUM BLD-SCNC: 136 MMOL/L (ref 132–146)
TOTAL PROTEIN: 5.5 G/DL (ref 6.4–8.3)
TRIGL SERPL-MCNC: 294 MG/DL (ref 0–149)
TSH SERPL DL<=0.05 MIU/L-ACNC: 3.36 UIU/ML (ref 0.27–4.2)
VLDLC SERPL CALC-MCNC: 59 MG/DL
WBC # BLD: 4.2 E9/L (ref 4.5–11.5)

## 2020-07-24 PROCEDURE — 93005 ELECTROCARDIOGRAM TRACING: CPT | Performed by: INTERNAL MEDICINE

## 2020-07-24 PROCEDURE — 6360000002 HC RX W HCPCS: Performed by: INTERNAL MEDICINE

## 2020-07-24 PROCEDURE — 6370000000 HC RX 637 (ALT 250 FOR IP): Performed by: OTOLARYNGOLOGY

## 2020-07-24 PROCEDURE — 6370000000 HC RX 637 (ALT 250 FOR IP): Performed by: INTERNAL MEDICINE

## 2020-07-24 PROCEDURE — 36415 COLL VENOUS BLD VENIPUNCTURE: CPT

## 2020-07-24 PROCEDURE — 85027 COMPLETE CBC AUTOMATED: CPT

## 2020-07-24 PROCEDURE — 2580000003 HC RX 258: Performed by: INTERNAL MEDICINE

## 2020-07-24 PROCEDURE — 84145 PROCALCITONIN (PCT): CPT

## 2020-07-24 PROCEDURE — 93010 ELECTROCARDIOGRAM REPORT: CPT | Performed by: INTERNAL MEDICINE

## 2020-07-24 PROCEDURE — 80053 COMPREHEN METABOLIC PANEL: CPT

## 2020-07-24 PROCEDURE — 94664 DEMO&/EVAL PT USE INHALER: CPT

## 2020-07-24 PROCEDURE — 99253 IP/OBS CNSLTJ NEW/EST LOW 45: CPT | Performed by: OTOLARYNGOLOGY

## 2020-07-24 PROCEDURE — 80061 LIPID PANEL: CPT

## 2020-07-24 PROCEDURE — 84443 ASSAY THYROID STIM HORMONE: CPT

## 2020-07-24 PROCEDURE — 84100 ASSAY OF PHOSPHORUS: CPT

## 2020-07-24 PROCEDURE — 6360000002 HC RX W HCPCS: Performed by: OTOLARYNGOLOGY

## 2020-07-24 PROCEDURE — 83036 HEMOGLOBIN GLYCOSYLATED A1C: CPT

## 2020-07-24 PROCEDURE — 94640 AIRWAY INHALATION TREATMENT: CPT

## 2020-07-24 PROCEDURE — 83735 ASSAY OF MAGNESIUM: CPT

## 2020-07-24 RX ORDER — DEXAMETHASONE SODIUM PHOSPHATE 4 MG/ML
8 INJECTION, SOLUTION INTRA-ARTICULAR; INTRALESIONAL; INTRAMUSCULAR; INTRAVENOUS; SOFT TISSUE EVERY 8 HOURS
Status: DISCONTINUED | OUTPATIENT
Start: 2020-07-24 | End: 2020-07-24 | Stop reason: HOSPADM

## 2020-07-24 RX ORDER — DULOXETIN HYDROCHLORIDE 60 MG/1
60 CAPSULE, DELAYED RELEASE ORAL DAILY
Qty: 30 CAPSULE | Refills: 0 | Status: SHIPPED | OUTPATIENT
Start: 2020-07-24 | End: 2022-09-28

## 2020-07-24 RX ORDER — ACETAMINOPHEN 325 MG/1
650 TABLET ORAL EVERY 6 HOURS PRN
Qty: 120 TABLET | Refills: 0
Start: 2020-07-24 | End: 2022-09-28

## 2020-07-24 RX ORDER — PREDNISONE 20 MG/1
20 TABLET ORAL 2 TIMES DAILY
Qty: 10 TABLET | Refills: 0 | Status: SHIPPED | OUTPATIENT
Start: 2020-07-24 | End: 2020-07-28

## 2020-07-24 RX ORDER — ACYCLOVIR 200 MG/1
200 CAPSULE ORAL
Qty: 50 CAPSULE | Refills: 0 | Status: SHIPPED | OUTPATIENT
Start: 2020-07-24 | End: 2020-08-03

## 2020-07-24 RX ORDER — CIPROFLOXACIN HYDROCHLORIDE 3.5 MG/ML
4 SOLUTION/ DROPS TOPICAL 2 TIMES DAILY
Status: DISCONTINUED | OUTPATIENT
Start: 2020-07-24 | End: 2020-07-24 | Stop reason: HOSPADM

## 2020-07-24 RX ORDER — OXYCODONE HYDROCHLORIDE AND ACETAMINOPHEN 5; 325 MG/1; MG/1
2 TABLET ORAL EVERY 4 HOURS PRN
Qty: 1 TABLET | Refills: 0
Start: 2020-07-24 | End: 2020-07-27

## 2020-07-24 RX ORDER — OXYCODONE HYDROCHLORIDE AND ACETAMINOPHEN 5; 325 MG/1; MG/1
1 TABLET ORAL EVERY 4 HOURS PRN
Qty: 12 TABLET | Refills: 0 | Status: SHIPPED | OUTPATIENT
Start: 2020-07-24 | End: 2020-07-27

## 2020-07-24 RX ORDER — SULFAMETHOXAZOLE AND TRIMETHOPRIM 800; 160 MG/1; MG/1
1 TABLET ORAL 2 TIMES DAILY
Qty: 14 TABLET | Refills: 0 | Status: SHIPPED | OUTPATIENT
Start: 2020-07-24 | End: 2020-07-31

## 2020-07-24 RX ORDER — CEPHALEXIN 500 MG/1
500 CAPSULE ORAL 4 TIMES DAILY
Qty: 20 CAPSULE | Refills: 0 | Status: SHIPPED | OUTPATIENT
Start: 2020-07-24 | End: 2020-07-29

## 2020-07-24 RX ORDER — ACYCLOVIR 200 MG/1
200 CAPSULE ORAL
Status: DISCONTINUED | OUTPATIENT
Start: 2020-07-24 | End: 2020-07-24 | Stop reason: HOSPADM

## 2020-07-24 RX ORDER — DEXAMETHASONE SODIUM PHOSPHATE 1 MG/ML
4 SOLUTION/ DROPS OPHTHALMIC 2 TIMES DAILY
Status: DISCONTINUED | OUTPATIENT
Start: 2020-07-24 | End: 2020-07-24 | Stop reason: HOSPADM

## 2020-07-24 RX ADMIN — CIPROFLOXACIN HYDROCHLORIDE 4 DROP: 3 SOLUTION/ DROPS OPHTHALMIC at 11:33

## 2020-07-24 RX ADMIN — ACYCLOVIR 200 MG: 200 CAPSULE ORAL at 11:34

## 2020-07-24 RX ADMIN — ASPIRIN 81 MG: 81 TABLET, COATED ORAL at 08:53

## 2020-07-24 RX ADMIN — DEXAMETHASONE SODIUM PHOSPHATE 8 MG: 4 INJECTION, SOLUTION INTRAMUSCULAR; INTRAVENOUS at 09:06

## 2020-07-24 RX ADMIN — SODIUM CHLORIDE, PRESERVATIVE FREE 10 ML: 5 INJECTION INTRAVENOUS at 08:58

## 2020-07-24 RX ADMIN — OXYCODONE AND ACETAMINOPHEN 2 TABLET: 5; 325 TABLET ORAL at 08:54

## 2020-07-24 RX ADMIN — VANCOMYCIN HYDROCHLORIDE 1.5 G: 10 INJECTION, POWDER, LYOPHILIZED, FOR SOLUTION INTRAVENOUS at 01:40

## 2020-07-24 RX ADMIN — PROPRANOLOL HYDROCHLORIDE 40 MG: 20 TABLET ORAL at 10:13

## 2020-07-24 RX ADMIN — BUSPIRONE HYDROCHLORIDE 10 MG: 10 TABLET ORAL at 08:56

## 2020-07-24 RX ADMIN — CEFEPIME HYDROCHLORIDE 2 G: 2 INJECTION, POWDER, FOR SOLUTION INTRAVENOUS at 04:53

## 2020-07-24 RX ADMIN — OXYCODONE AND ACETAMINOPHEN 2 TABLET: 5; 325 TABLET ORAL at 03:53

## 2020-07-24 RX ADMIN — GABAPENTIN 800 MG: 400 CAPSULE ORAL at 08:51

## 2020-07-24 RX ADMIN — ARFORMOTEROL TARTRATE 15 MCG: 15 SOLUTION RESPIRATORY (INHALATION) at 09:42

## 2020-07-24 RX ADMIN — BUDESONIDE 500 MCG: 0.5 SUSPENSION RESPIRATORY (INHALATION) at 09:43

## 2020-07-24 RX ADMIN — PREDNISOLONE ACETATE 1 DROP: 10 SUSPENSION/ DROPS OPHTHALMIC at 10:17

## 2020-07-24 RX ADMIN — GENTAMICIN SULFATE 1 DROP: 3 SOLUTION/ DROPS OPHTHALMIC at 10:17

## 2020-07-24 RX ADMIN — GABAPENTIN 800 MG: 400 CAPSULE ORAL at 14:29

## 2020-07-24 RX ADMIN — LEVETIRACETAM 750 MG: 250 TABLET ORAL at 08:50

## 2020-07-24 RX ADMIN — AMLODIPINE BESYLATE 5 MG: 5 TABLET ORAL at 08:53

## 2020-07-24 RX ADMIN — LAMOTRIGINE 50 MG: 25 TABLET ORAL at 08:50

## 2020-07-24 RX ADMIN — ENOXAPARIN SODIUM 40 MG: 40 INJECTION SUBCUTANEOUS at 10:12

## 2020-07-24 RX ADMIN — DEXAMETHASONE SODIUM PHOSPHATE 4 DROP: 1 SOLUTION/ DROPS OPHTHALMIC at 11:33

## 2020-07-24 RX ADMIN — DULOXETINE HYDROCHLORIDE 60 MG: 60 CAPSULE, DELAYED RELEASE ORAL at 08:50

## 2020-07-24 ASSESSMENT — PAIN DESCRIPTION - PAIN TYPE: TYPE: ACUTE PAIN

## 2020-07-24 ASSESSMENT — PAIN DESCRIPTION - FREQUENCY: FREQUENCY: CONTINUOUS

## 2020-07-24 ASSESSMENT — PAIN SCALES - GENERAL
PAINLEVEL_OUTOF10: 6
PAINLEVEL_OUTOF10: 7
PAINLEVEL_OUTOF10: 7

## 2020-07-24 ASSESSMENT — PAIN DESCRIPTION - DESCRIPTORS: DESCRIPTORS: ACHING;CONSTANT;DISCOMFORT

## 2020-07-24 ASSESSMENT — PAIN DESCRIPTION - ORIENTATION: ORIENTATION: LOWER

## 2020-07-24 ASSESSMENT — PAIN DESCRIPTION - PROGRESSION: CLINICAL_PROGRESSION: GRADUALLY IMPROVING

## 2020-07-24 NOTE — DISCHARGE SUMMARY
Discharge Summary    Admit date: 7/23/2020    Discharge date and time: 7/24/2020  3:35 PM     Admitting Physician: Abhinav Bowie DO     Consultants: ENT    Admission Diagnoses:  Facial cellulitis    Discharge Diagnoses:   1. Shingles and surrounding facial cellulitis  2. Hypertension  3. COPD  4. History of seizure disorder  5. Tobacco abuse   6. History of brain surgery    Hospital Course:  59 y.o. male who presented to [address] [place] [address] with right side head/face pain. History obtained from the patient. He states he developed right side head/face pain approximately three days prior to presentation. He states he is having difficulty hearing out of the right ear, which is new for him. He did go to [facility] clinic today for exam, was sent to ED for evaluation. While in the ED, he did develop chest pain that resolved with supplemental oxygen. He described the chest pain as indigestion, non-radiating and only lasting a few minutes. He states he has not had any chest pain since. The right ear is warm to touch, red and swollen. He was admitted to the hospital and ENT was consulted. ENT believes that this rash is likely shingles in the V2 V3 distribution. During my examination of him I asked him about his chest pain. He states he thinks is indigestion. I offered him a stress test but he refused at that time and stated that he would let us know if he had any more chest pain. In the hospital he did not have any more chest pain. The next day, the pain was improving and erythema lessening. He is anxious for discharge from the hospital.  We clarified with ENT that they are also okay with patient being discharged. He is to follow-up with ENT in 1 week. He was discharged with Ciprodex, prednisone, acyclovir.     Discharge Exam:     Vitals:    07/24/20 1000   BP: 134/81   Pulse: 101   Resp:    Temp:    SpO2:        General appearance:  No apparent distress, appears stated age and cooperative. HEENT:   Right ears: And face with some erythema and vesicles, however erythema has improved yesterday. Original picture on presentation  in the chart. Neck: Supple, with full range of motion. No jugular venous distention. Trachea midline. Respiratory:   Diminished bilaterally clear to auscultation, bilaterally without Rales/Wheezes/Rhonchi. Cardiovascular:  Regular rate and rhythm with normal S1/S2 without murmurs, rubs or gallops. Abdomen: Soft, non-tender, non-distended with normal bowel sounds. Musculoskeletal:  No clubbing, cyanosis or edema bilaterally. Full range of motion without deformity. Skin: Skin color, texture, turgor normal.  No rashes or lesions. Neurologic:  Neurovascularly intact without any focal sensory/motor deficits. Psychiatric:  Alert and oriented, thought content appropriate, normal insight  Capillary Refill: Brisk,< 3 seconds   Peripheral Pulses: +2 palpable, equal bilaterally     Disposition: Home  The patient's condition is *good  At this time the patient is without objective evidence of an acute process requiring continuing hospitalization or inpatient management. They are stable for discharge with outpatient follow-up.     I have spoken with the patient and discussed the results of the current hospitalization, in addition to providing specific details for the plan of care and counseling regarding the diagnosis and prognosis. The plan has been discussed in detail and they are aware of the specific conditions for emergent return, as well as the importance of follow-up. Their questions are answered at this time and they are agreeable with the plan for discharge to home     Patient Instructions: Follow-up with ENT in 1 week.   Take medications as directed    Discharge Medications:     Medication List      START taking these medications    acetaminophen 325 MG tablet  Commonly known as:  TYLENOL  Take 2 tablets by mouth every 6 hours as needed for Pain acyclovir 200 MG capsule  Commonly known as:  ZOVIRAX  Take 1 capsule by mouth 5 times daily for 10 days     cephALEXin 500 MG capsule  Commonly known as:  KEFLEX  Take 1 capsule by mouth 4 times daily for 5 days     Ciprodex 0.3-0.1 % otic suspension  Generic drug:  ciprofloxacin-dexamethasone  Place 4 drops into both ears 2 times daily for 7 days     * oxyCODONE-acetaminophen 5-325 MG per tablet  Commonly known as:  PERCOCET  Take 1 tablet by mouth every 4 hours as needed for Pain (for breakthrough pain) for up to 3 days. * oxyCODONE-acetaminophen 5-325 MG per tablet  Commonly known as:  PERCOCET  Take 2 tablets by mouth every 4 hours as needed for Pain for up to 3 days. predniSONE 20 MG tablet  Commonly known as:  DELTASONE  Take 1 tablet by mouth 2 times daily for 4 days     sulfamethoxazole-trimethoprim 800-160 MG per tablet  Commonly known as:  BACTRIM DS;SEPTRA DS  Take 1 tablet by mouth 2 times daily for 7 days         * This list has 2 medication(s) that are the same as other medications prescribed for you. Read the directions carefully, and ask your doctor or other care provider to review them with you.             CHANGE how you take these medications    DULoxetine 60 MG extended release capsule  Commonly known as:  CYMBALTA  Take 1 capsule by mouth daily  What changed:  when to take this        CONTINUE taking these medications    * albuterol sulfate  (90 Base) MCG/ACT inhaler  Commonly known as:  ProAir HFA  Inhale 2 puffs into the lungs every 6 hours as needed for Wheezing     * albuterol (5 MG/ML) 0.5% nebulizer solution  Commonly known as:  PROVENTIL  Take 0.5 mLs by nebulization every 6 hours as needed for Wheezing     amLODIPine 5 MG tablet  Commonly known as:  NORVASC     aspirin 81 MG EC tablet     busPIRone 10 MG tablet  Commonly known as:  BUSPAR     clonazePAM 1 MG tablet  Commonly known as:  KLONOPIN     fluticasone-vilanterol 100-25 MCG/INH Aepb inhaler  Commonly known as: instructed to resume home medications and take new medications as indicated in the list above. · If the patient has a recurrence of symptoms, they are instructed to go to the ED. Preparing for this patient's discharge, including paperwork, orders, instructions, and meeting with patient did require > 30 minutes.     Cathy Pineda DO   9:25 PM  7/24/2020

## 2020-07-24 NOTE — CARE COORDINATION
Spoke with Torin  at [address] [facility], should pt require home abx at discharge this would have to be relayed to Kalen Vang and that physician would need to be in agreement. Torin to call me back with name of phone of ID physician however, weekend staff please note, that this physician whomever it will be, will not be available over the weekend.

## 2020-07-24 NOTE — CARE COORDINATION
Met with pt at bedside to discuss discharge / transition of care plan. Pt from home sent in by [address] [facility]. Pt reports from home with son Gerard Chaves, independent of all ADL, has walker does not use though, denies further DME needs, verified pcp and pharmacy, discharge plan is to return home when medically stable. Pt reports his neighbor whom brought him to [address] will provide transport home when discharged. Pt has her phone and will call at that time. Per pt he is 80% service connected and is willing to transfer to [facility] if necessary, he is unsure of travel benefits. I did call the transfer line however, the person that answered was only able to tell me who the  would be (Ajit Boland) however, could not find his phone number and requested I call back after 11 to speak to him.

## 2020-07-24 NOTE — PROGRESS NOTES
Dr. Carissa zimmer served in regards to the patient possibly being discharged today. Awaiting response.

## 2020-07-24 NOTE — CARE COORDINATION
Called back to speak to assigned  after 11 as instructed however, that  was unable to discuss (Alfredo Velazquez at [phone]), provided my cell for his to return my calls and discuss options should pt be discharged home over the weekend with need for iv abx.

## 2020-07-28 LAB
BLOOD CULTURE, ROUTINE: NORMAL
CULTURE, BLOOD 2: NORMAL

## 2021-08-21 ENCOUNTER — APPOINTMENT (OUTPATIENT)
Dept: GENERAL RADIOLOGY | Age: 66
End: 2021-08-21
Payer: MEDICARE

## 2021-08-21 ENCOUNTER — HOSPITAL ENCOUNTER (EMERGENCY)
Age: 66
Discharge: LEFT BEFORE DISCHARGE | End: 2021-08-22
Attending: EMERGENCY MEDICINE
Payer: MEDICARE

## 2021-08-21 VITALS
SYSTOLIC BLOOD PRESSURE: 129 MMHG | DIASTOLIC BLOOD PRESSURE: 84 MMHG | HEART RATE: 96 BPM | WEIGHT: 197 LBS | OXYGEN SATURATION: 95 % | RESPIRATION RATE: 16 BRPM | HEIGHT: 72 IN | BODY MASS INDEX: 26.68 KG/M2

## 2021-08-21 DIAGNOSIS — T40.1X1A ACCIDENTAL OVERDOSE OF HEROIN, INITIAL ENCOUNTER (HCC): Primary | ICD-10-CM

## 2021-08-21 PROCEDURE — 80053 COMPREHEN METABOLIC PANEL: CPT

## 2021-08-21 PROCEDURE — 2580000003 HC RX 258: Performed by: EMERGENCY MEDICINE

## 2021-08-21 PROCEDURE — 80143 DRUG ASSAY ACETAMINOPHEN: CPT

## 2021-08-21 PROCEDURE — 85025 COMPLETE CBC W/AUTO DIFF WBC: CPT

## 2021-08-21 PROCEDURE — 80179 DRUG ASSAY SALICYLATE: CPT

## 2021-08-21 PROCEDURE — 82077 ASSAY SPEC XCP UR&BREATH IA: CPT

## 2021-08-21 PROCEDURE — 99282 EMERGENCY DEPT VISIT SF MDM: CPT

## 2021-08-21 PROCEDURE — 84484 ASSAY OF TROPONIN QUANT: CPT

## 2021-08-21 PROCEDURE — 71045 X-RAY EXAM CHEST 1 VIEW: CPT

## 2021-08-21 PROCEDURE — 80307 DRUG TEST PRSMV CHEM ANLYZR: CPT

## 2021-08-21 RX ORDER — 0.9 % SODIUM CHLORIDE 0.9 %
1000 INTRAVENOUS SOLUTION INTRAVENOUS ONCE
Status: COMPLETED | OUTPATIENT
Start: 2021-08-21 | End: 2021-08-22

## 2021-08-21 RX ADMIN — SODIUM CHLORIDE 1000 ML: 9 INJECTION, SOLUTION INTRAVENOUS at 23:35

## 2021-08-22 LAB
ACETAMINOPHEN LEVEL: <5 MCG/ML (ref 10–30)
ALBUMIN SERPL-MCNC: 4.5 G/DL (ref 3.5–5.2)
ALP BLD-CCNC: 102 U/L (ref 40–129)
ALT SERPL-CCNC: 21 U/L (ref 0–40)
AMPHETAMINE SCREEN, URINE: NOT DETECTED
ANION GAP SERPL CALCULATED.3IONS-SCNC: 15 MMOL/L (ref 7–16)
ANISOCYTOSIS: ABNORMAL
AST SERPL-CCNC: 24 U/L (ref 0–39)
BARBITURATE SCREEN URINE: NOT DETECTED
BASOPHILS ABSOLUTE: 0.07 E9/L (ref 0–0.2)
BASOPHILS RELATIVE PERCENT: 0.5 % (ref 0–2)
BENZODIAZEPINE SCREEN, URINE: POSITIVE
BILIRUB SERPL-MCNC: 0.2 MG/DL (ref 0–1.2)
BUN BLDV-MCNC: 21 MG/DL (ref 6–23)
CALCIUM SERPL-MCNC: 9.6 MG/DL (ref 8.6–10.2)
CANNABINOID SCREEN URINE: NOT DETECTED
CHLORIDE BLD-SCNC: 102 MMOL/L (ref 98–107)
CO2: 23 MMOL/L (ref 22–29)
COCAINE METABOLITE SCREEN URINE: POSITIVE
CREAT SERPL-MCNC: 1.5 MG/DL (ref 0.7–1.2)
EKG ATRIAL RATE: 88 BPM
EKG P AXIS: 64 DEGREES
EKG P-R INTERVAL: 168 MS
EKG Q-T INTERVAL: 366 MS
EKG QRS DURATION: 100 MS
EKG QTC CALCULATION (BAZETT): 442 MS
EKG R AXIS: 0 DEGREES
EKG T AXIS: 48 DEGREES
EKG VENTRICULAR RATE: 88 BPM
EOSINOPHILS ABSOLUTE: 0.07 E9/L (ref 0.05–0.5)
EOSINOPHILS RELATIVE PERCENT: 0.5 % (ref 0–6)
ETHANOL: <10 MG/DL (ref 0–0.08)
FENTANYL SCREEN, URINE: POSITIVE
GFR AFRICAN AMERICAN: 57
GFR NON-AFRICAN AMERICAN: 47 ML/MIN/1.73
GLUCOSE BLD-MCNC: 107 MG/DL (ref 74–99)
HCT VFR BLD CALC: 46.8 % (ref 37–54)
HEMOGLOBIN: 15.5 G/DL (ref 12.5–16.5)
IMMATURE GRANULOCYTES #: 0.18 E9/L
IMMATURE GRANULOCYTES %: 1.4 % (ref 0–5)
LYMPHOCYTES ABSOLUTE: 0.35 E9/L (ref 1.5–4)
LYMPHOCYTES RELATIVE PERCENT: 2.7 % (ref 20–42)
Lab: ABNORMAL
MCH RBC QN AUTO: 32.4 PG (ref 26–35)
MCHC RBC AUTO-ENTMCNC: 33.1 % (ref 32–34.5)
MCV RBC AUTO: 97.7 FL (ref 80–99.9)
METHADONE SCREEN, URINE: NOT DETECTED
MONOCYTES ABSOLUTE: 0.63 E9/L (ref 0.1–0.95)
MONOCYTES RELATIVE PERCENT: 4.9 % (ref 2–12)
NEUTROPHILS ABSOLUTE: 11.66 E9/L (ref 1.8–7.3)
NEUTROPHILS RELATIVE PERCENT: 90 % (ref 43–80)
OPIATE SCREEN URINE: NOT DETECTED
OVALOCYTES: ABNORMAL
OXYCODONE URINE: NOT DETECTED
PDW BLD-RTO: 14.5 FL (ref 11.5–15)
PHENCYCLIDINE SCREEN URINE: NOT DETECTED
PLATELET # BLD: 242 E9/L (ref 130–450)
PMV BLD AUTO: 9.3 FL (ref 7–12)
POIKILOCYTES: ABNORMAL
POLYCHROMASIA: ABNORMAL
POTASSIUM SERPL-SCNC: 5.3 MMOL/L (ref 3.5–5)
RBC # BLD: 4.79 E12/L (ref 3.8–5.8)
SALICYLATE, SERUM: <0.3 MG/DL (ref 0–30)
SODIUM BLD-SCNC: 140 MMOL/L (ref 132–146)
STOMATOCYTES: ABNORMAL
TOTAL PROTEIN: 7.1 G/DL (ref 6.4–8.3)
TRICYCLIC ANTIDEPRESSANTS SCREEN SERUM: NEGATIVE NG/ML
TROPONIN, HIGH SENSITIVITY: 24 NG/L (ref 0–11)
WBC # BLD: 13 E9/L (ref 4.5–11.5)

## 2021-08-22 PROCEDURE — 93005 ELECTROCARDIOGRAM TRACING: CPT | Performed by: EMERGENCY MEDICINE

## 2021-08-22 PROCEDURE — 93010 ELECTROCARDIOGRAM REPORT: CPT | Performed by: INTERNAL MEDICINE

## 2021-08-22 PROCEDURE — 80307 DRUG TEST PRSMV CHEM ANLYZR: CPT

## 2021-08-22 NOTE — ED PROVIDER NOTES
HPI:  8/21/21, Time: 9:37 PM EDT         Ute Schmitt is a 77 y.o. male presenting to the ED for accidental overdose of heroin, beginning short time ago. The complaint has been persistent, moderate in severity, and worsened by nothing. Patient was found unresponsive on couch. He was given Narcan by EMS. Patient reports vomiting afterwards. Patient does admit to using heroin. Patient reporting no chest pain he reports no abdominal pain he does report nausea. Patient reporting no headache he reports no neck or back pain he does have chronic lower back pain with sciatica. Patient reporting no homicidal suicidal thoughts. ROS:   Pertinent positives and negatives are stated within HPI, all other systems reviewed and are negative.  --------------------------------------------- PAST HISTORY ---------------------------------------------  Past Medical History:  has a past medical history of Back pain, chronic, Brain tumor ([facility] [address] Chest pain, Herniated disc, Marijuana use, Seizure disorder ([facility] [address] Seizures ([facility] [address] Tobacco abuse, Tobacco abuse, and Unspecified cerebral artery occlusion with cerebral infarction. Past Surgical History:  has a past surgical history that includes Eye surgery (Bilateral); Tonsillectomy; External ear surgery; cervical fusion; and brain surgery. Social History:  reports that he has been smoking cigarettes. He has a 20.00 pack-year smoking history. He has never used smokeless tobacco. He reports that he does not drink alcohol and does not use drugs. Family History: family history includes Seizures in his son; Substance Abuse in his father. The patients home medications have been reviewed.     Allergies: Tramadol    ---------------------------------------------------PHYSICAL EXAM--------------------------------------    Constitutional/General: Alert and oriented x3, mild distress  Head: Normocephalic and atraumatic  Eyes: PERRL, EOMI  Mouth: Oropharynx clear, handling secretions, no trismus  Neck: Supple, full ROM, non tender to palpation in the midline, no stridor, no crepitus, no meningeal signs  Pulmonary: Lungs clear to auscultation bilaterally, no wheezes, rales, or rhonchi. Not in respiratory distress  Cardiovascular:  Regular rate. Regular rhythm. No murmurs, gallops, or rubs. 2+ distal pulses  Chest: no chest wall tenderness  Abdomen: Soft. Non tender. Non distended. +BS. No rebound, guarding, or rigidity. No pulsatile masses appreciated. Musculoskeletal: Moves all extremities x 4. Warm and well perfused, no clubbing, cyanosis, or edema. Capillary refill <3 seconds  Skin: warm and dry. No rashes. Neurologic: GCS 15, CN 2-12 grossly intact, no focal deficits, symmetric strength 5/5 in the upper and lower extremities bilaterally  Psych: Not homicidal or suicidal    -------------------------------------------------- RESULTS -------------------------------------------------  I have personally reviewed all laboratory and imaging results for this patient. Results are listed below.      LABS:  Results for orders placed or performed during the hospital encounter of 08/21/21   CBC auto differential   Result Value Ref Range    WBC 13.0 (H) 4.5 - 11.5 E9/L    RBC 4.79 3.80 - 5.80 E12/L    Hemoglobin 15.5 12.5 - 16.5 g/dL    Hematocrit 46.8 37.0 - 54.0 %    MCV 97.7 80.0 - 99.9 fL    MCH 32.4 26.0 - 35.0 pg    MCHC 33.1 32.0 - 34.5 %    RDW 14.5 11.5 - 15.0 fL    Platelets 167 425 - 975 E9/L    MPV 9.3 7.0 - 12.0 fL    Neutrophils % 90.0 (H) 43.0 - 80.0 %    Immature Granulocytes % 1.4 0.0 - 5.0 %    Lymphocytes % 2.7 (L) 20.0 - 42.0 %    Monocytes % 4.9 2.0 - 12.0 %    Eosinophils % 0.5 0.0 - 6.0 %    Basophils % 0.5 0.0 - 2.0 %    Neutrophils Absolute 11.66 (H) 1.80 - 7.30 E9/L    Immature Granulocytes # 0.18 E9/L    Lymphocytes Absolute 0.35 (L) 1.50 - 4.00 E9/L    Monocytes Absolute 0.63 0.10 - 0.95 E9/L    Eosinophils Absolute 0.07 0.05 - 0.50 E9/L    Basophils Absolute 0. 07 0.00 - 0.20 E9/L    Anisocytosis 1+     Polychromasia 1+     Poikilocytes 1+     Ovalocytes 1+     Stomatocytes 1+    Comprehensive Metabolic Panel   Result Value Ref Range    Sodium 140 132 - 146 mmol/L    Potassium 5.3 (H) 3.5 - 5.0 mmol/L    Chloride 102 98 - 107 mmol/L    CO2 23 22 - 29 mmol/L    Anion Gap 15 7 - 16 mmol/L    Glucose 107 (H) 74 - 99 mg/dL    BUN 21 6 - 23 mg/dL    CREATININE 1.5 (H) 0.7 - 1.2 mg/dL    GFR Non-African American 47 >=60 mL/min/1.73    GFR African American 57     Calcium 9.6 8.6 - 10.2 mg/dL    Total Protein 7.1 6.4 - 8.3 g/dL    Albumin 4.5 3.5 - 5.2 g/dL    Total Bilirubin 0.2 0.0 - 1.2 mg/dL    Alkaline Phosphatase 102 40 - 129 U/L    ALT 21 0 - 40 U/L    AST 24 0 - 39 U/L   Troponin   Result Value Ref Range    Troponin, High Sensitivity 24 (H) 0 - 11 ng/L   Serum Drug Screen   Result Value Ref Range    Ethanol Lvl <10 mg/dL    Acetaminophen Level <5.0 (L) 10.0 - 31.3 mcg/mL    Salicylate, Serum <1.6 0.0 - 30.0 mg/dL    TCA Scrn NEGATIVE Cutoff:300 ng/mL   Urine Drug Screen   Result Value Ref Range    Amphetamine Screen, Urine NOT DETECTED Negative <1000 ng/mL    Barbiturate Screen, Ur NOT DETECTED Negative < 200 ng/mL    Benzodiazepine Screen, Urine POSITIVE (A) Negative < 200 ng/mL    Cannabinoid Scrn, Ur NOT DETECTED Negative < 50ng/mL    Cocaine Metabolite Screen, Urine POSITIVE (A) Negative < 300 ng/mL    Opiate Scrn, Ur NOT DETECTED Negative < 300ng/mL    PCP Screen, Urine NOT DETECTED Negative < 25 ng/mL    Methadone Screen, Urine NOT DETECTED Negative <300 ng/mL    Oxycodone Urine NOT DETECTED Negative <100 ng/mL    FENTANYL SCREEN, URINE POSITIVE (A) Negative <1 ng/mL    Drug Screen Comment: see below        RADIOLOGY:  Interpreted by Radiologist.  XR CHEST PORTABLE   Final Result   No acute process. Infusion port in place. EKG:  This EKG is signed and interpreted by me.     Rate: 88  Rhythm: Sinus  Interpretation: no acute changes  Comparison: no previous EKG available        ------------------------- NURSING NOTES AND VITALS REVIEWED ---------------------------   The nursing notes within the ED encounter and vital signs as below have been reviewed by myself. /84   Pulse 96   Resp 16   Ht 6' (1.829 m)   Wt 197 lb (89.4 kg)   SpO2 95%   BMI 26.72 kg/m²   Oxygen Saturation Interpretation: Normal    The patients available past medical records and past encounters were reviewed. ------------------------------ ED COURSE/MEDICAL DECISION MAKING----------------------  Medications   0.9 % sodium chloride bolus (0 mLs Intravenous Stopped 8/22/21 0115)             Medical Decision Making:        Re-Evaluations:             Re-evaluation. Patients symptoms show no change    Reevaluated and was resting comfortably. Patient was ambulatory was able to give us urinalysis. I went to recheck on patient. Reportedly eloped from the emergency department. Consultations:                 Critical Care: This patient's ED course included: a personal history and physicial eaxmination    This patient has been closely monitored during their ED course. Counseling: The emergency provider has spoken with the patient and discussed todays results, in addition to providing specific details for the plan of care and counseling regarding the diagnosis and prognosis. Questions are answered at this time and they are agreeable with the plan.       --------------------------------- IMPRESSION AND DISPOSITION ---------------------------------    IMPRESSION  1. Accidental overdose of heroin, initial encounter [facility])        DISPOSITION  Disposition:   Patient eloped from the emergency department        NOTE: This report was transcribed using voice recognition software.  Every effort was made to ensure accuracy; however, inadvertent computerized transcription errors may be present          Timmy Ariza MD  08/22/21 0956       Timmy Ariza MD  08/22/21 Se Whiteside MD  08/22/21 2018

## 2021-08-22 NOTE — ED NOTES
Pt states he met a woman and went back to her apartment and snorted what he thought was heroin. States he remembers nothing after that and isn't sure who called 911. Pt is alert and oriented and appears in no distress. Labs obtained and sent. Pt aware urine is needed. Pt asking when he can leave.       Neema Zarate, ARSEN  08/22/21 8950

## 2022-01-21 ENCOUNTER — APPOINTMENT (OUTPATIENT)
Dept: GENERAL RADIOLOGY | Age: 67
DRG: 193 | End: 2022-01-21
Payer: OTHER GOVERNMENT

## 2022-01-21 ENCOUNTER — APPOINTMENT (OUTPATIENT)
Dept: CT IMAGING | Age: 67
DRG: 193 | End: 2022-01-21
Payer: OTHER GOVERNMENT

## 2022-01-21 ENCOUNTER — HOSPITAL ENCOUNTER (INPATIENT)
Age: 67
LOS: 3 days | Discharge: HOME | DRG: 193 | End: 2022-01-24
Attending: EMERGENCY MEDICINE | Admitting: INTERNAL MEDICINE
Payer: OTHER GOVERNMENT

## 2022-01-21 DIAGNOSIS — M62.82 NON-TRAUMATIC RHABDOMYOLYSIS: ICD-10-CM

## 2022-01-21 DIAGNOSIS — R06.02 SHORTNESS OF BREATH: Primary | ICD-10-CM

## 2022-01-21 DIAGNOSIS — J96.01 ACUTE RESPIRATORY FAILURE WITH HYPOXIA (HCC): ICD-10-CM

## 2022-01-21 LAB
ACETAMINOPHEN LEVEL: <5 MCG/ML (ref 10–30)
ALBUMIN SERPL-MCNC: 3.6 G/DL (ref 3.5–5.2)
ALP BLD-CCNC: 69 U/L (ref 40–129)
ALT SERPL-CCNC: 44 U/L (ref 0–40)
AMPHETAMINE SCREEN, URINE: NOT DETECTED
ANION GAP SERPL CALCULATED.3IONS-SCNC: 14 MMOL/L (ref 7–16)
AST SERPL-CCNC: 56 U/L (ref 0–39)
BACTERIA: NORMAL /HPF
BARBITURATE SCREEN URINE: NOT DETECTED
BASOPHILS ABSOLUTE: 0.01 E9/L (ref 0–0.2)
BASOPHILS RELATIVE PERCENT: 0.1 % (ref 0–2)
BENZODIAZEPINE SCREEN, URINE: NOT DETECTED
BILIRUB SERPL-MCNC: 0.5 MG/DL (ref 0–1.2)
BILIRUBIN URINE: NEGATIVE
BLOOD, URINE: ABNORMAL
BUN BLDV-MCNC: 16 MG/DL (ref 6–23)
CALCIUM SERPL-MCNC: 9.1 MG/DL (ref 8.6–10.2)
CANNABINOID SCREEN URINE: NOT DETECTED
CHLORIDE BLD-SCNC: 101 MMOL/L (ref 98–107)
CLARITY: CLEAR
CO2: 24 MMOL/L (ref 22–29)
COCAINE METABOLITE SCREEN URINE: NOT DETECTED
COLOR: YELLOW
CREAT SERPL-MCNC: 1.1 MG/DL (ref 0.7–1.2)
EOSINOPHILS ABSOLUTE: 0 E9/L (ref 0.05–0.5)
EOSINOPHILS RELATIVE PERCENT: 0 % (ref 0–6)
ETHANOL: <10 MG/DL (ref 0–0.08)
FENTANYL SCREEN, URINE: NOT DETECTED
GFR AFRICAN AMERICAN: >60
GFR NON-AFRICAN AMERICAN: >60 ML/MIN/1.73
GLUCOSE BLD-MCNC: 111 MG/DL (ref 74–99)
GLUCOSE URINE: NEGATIVE MG/DL
HCT VFR BLD CALC: 40.2 % (ref 37–54)
HEMOGLOBIN: 13.2 G/DL (ref 12.5–16.5)
HYPOCHROMIA: ABNORMAL
IMMATURE GRANULOCYTES #: 0.14 E9/L
IMMATURE GRANULOCYTES %: 1.6 % (ref 0–5)
KETONES, URINE: ABNORMAL MG/DL
LACTIC ACID, SEPSIS: 1.6 MMOL/L (ref 0.5–1.9)
LEUKOCYTE ESTERASE, URINE: NEGATIVE
LIPASE: 82 U/L (ref 13–60)
LYMPHOCYTES ABSOLUTE: 0.45 E9/L (ref 1.5–4)
LYMPHOCYTES RELATIVE PERCENT: 5.2 % (ref 20–42)
Lab: NORMAL
MCH RBC QN AUTO: 30 PG (ref 26–35)
MCHC RBC AUTO-ENTMCNC: 32.8 % (ref 32–34.5)
MCV RBC AUTO: 91.4 FL (ref 80–99.9)
METHADONE SCREEN, URINE: NOT DETECTED
MONOCYTES ABSOLUTE: 0.72 E9/L (ref 0.1–0.95)
MONOCYTES RELATIVE PERCENT: 8.3 % (ref 2–12)
NEUTROPHILS ABSOLUTE: 7.35 E9/L (ref 1.8–7.3)
NEUTROPHILS RELATIVE PERCENT: 84.8 % (ref 43–80)
NITRITE, URINE: NEGATIVE
OPIATE SCREEN URINE: NOT DETECTED
OVALOCYTES: ABNORMAL
OXYCODONE URINE: NOT DETECTED
PDW BLD-RTO: 14 FL (ref 11.5–15)
PH UA: 6 (ref 5–9)
PHENCYCLIDINE SCREEN URINE: NOT DETECTED
PLATELET # BLD: 282 E9/L (ref 130–450)
PMV BLD AUTO: 10.4 FL (ref 7–12)
POLYCHROMASIA: ABNORMAL
POTASSIUM REFLEX MAGNESIUM: 3.6 MMOL/L (ref 3.5–5)
PRO-BNP: 48 PG/ML (ref 0–125)
PROTEIN UA: 100 MG/DL
RBC # BLD: 4.4 E12/L (ref 3.8–5.8)
RBC UA: NORMAL /HPF (ref 0–2)
SALICYLATE, SERUM: <0.3 MG/DL (ref 0–30)
SARS-COV-2, NAAT: NOT DETECTED
SARS-COV-2, NAAT: NOT DETECTED
SODIUM BLD-SCNC: 139 MMOL/L (ref 132–146)
SPECIFIC GRAVITY UA: 1.02 (ref 1–1.03)
SPHEROCYTES: ABNORMAL
TOTAL CK: 1063 U/L (ref 20–200)
TOTAL PROTEIN: 6.7 G/DL (ref 6.4–8.3)
TRICYCLIC ANTIDEPRESSANTS SCREEN SERUM: NEGATIVE NG/ML
TROPONIN, HIGH SENSITIVITY: 39 NG/L (ref 0–11)
TROPONIN, HIGH SENSITIVITY: 45 NG/L (ref 0–11)
UROBILINOGEN, URINE: 1 E.U./DL
WBC # BLD: 8.7 E9/L (ref 4.5–11.5)
WBC UA: NORMAL /HPF (ref 0–5)

## 2022-01-21 PROCEDURE — 83880 ASSAY OF NATRIURETIC PEPTIDE: CPT

## 2022-01-21 PROCEDURE — 82077 ASSAY SPEC XCP UR&BREATH IA: CPT

## 2022-01-21 PROCEDURE — 84484 ASSAY OF TROPONIN QUANT: CPT

## 2022-01-21 PROCEDURE — 74177 CT ABD & PELVIS W/CONTRAST: CPT

## 2022-01-21 PROCEDURE — 96361 HYDRATE IV INFUSION ADD-ON: CPT

## 2022-01-21 PROCEDURE — 81001 URINALYSIS AUTO W/SCOPE: CPT

## 2022-01-21 PROCEDURE — 80053 COMPREHEN METABOLIC PANEL: CPT

## 2022-01-21 PROCEDURE — 71045 X-RAY EXAM CHEST 1 VIEW: CPT

## 2022-01-21 PROCEDURE — 87040 BLOOD CULTURE FOR BACTERIA: CPT

## 2022-01-21 PROCEDURE — 87088 URINE BACTERIA CULTURE: CPT

## 2022-01-21 PROCEDURE — 83690 ASSAY OF LIPASE: CPT

## 2022-01-21 PROCEDURE — 80143 DRUG ASSAY ACETAMINOPHEN: CPT

## 2022-01-21 PROCEDURE — 85025 COMPLETE CBC W/AUTO DIFF WBC: CPT

## 2022-01-21 PROCEDURE — 2580000003 HC RX 258: Performed by: STUDENT IN AN ORGANIZED HEALTH CARE EDUCATION/TRAINING PROGRAM

## 2022-01-21 PROCEDURE — 36415 COLL VENOUS BLD VENIPUNCTURE: CPT

## 2022-01-21 PROCEDURE — 96374 THER/PROPH/DIAG INJ IV PUSH: CPT

## 2022-01-21 PROCEDURE — 2060000000 HC ICU INTERMEDIATE R&B

## 2022-01-21 PROCEDURE — 71275 CT ANGIOGRAPHY CHEST: CPT

## 2022-01-21 PROCEDURE — 99285 EMERGENCY DEPT VISIT HI MDM: CPT

## 2022-01-21 PROCEDURE — 80179 DRUG ASSAY SALICYLATE: CPT

## 2022-01-21 PROCEDURE — 80307 DRUG TEST PRSMV CHEM ANLYZR: CPT

## 2022-01-21 PROCEDURE — 6360000004 HC RX CONTRAST MEDICATION: Performed by: RADIOLOGY

## 2022-01-21 PROCEDURE — 93005 ELECTROCARDIOGRAM TRACING: CPT | Performed by: STUDENT IN AN ORGANIZED HEALTH CARE EDUCATION/TRAINING PROGRAM

## 2022-01-21 PROCEDURE — 6370000000 HC RX 637 (ALT 250 FOR IP): Performed by: EMERGENCY MEDICINE

## 2022-01-21 PROCEDURE — 87635 SARS-COV-2 COVID-19 AMP PRB: CPT

## 2022-01-21 PROCEDURE — 6360000002 HC RX W HCPCS: Performed by: STUDENT IN AN ORGANIZED HEALTH CARE EDUCATION/TRAINING PROGRAM

## 2022-01-21 PROCEDURE — 82550 ASSAY OF CK (CPK): CPT

## 2022-01-21 PROCEDURE — 83605 ASSAY OF LACTIC ACID: CPT

## 2022-01-21 RX ORDER — ACETAMINOPHEN 500 MG
1000 TABLET ORAL ONCE
Status: COMPLETED | OUTPATIENT
Start: 2022-01-21 | End: 2022-01-21

## 2022-01-21 RX ORDER — ACETAMINOPHEN 500 MG
1000 TABLET ORAL ONCE
Status: DISCONTINUED | OUTPATIENT
Start: 2022-01-21 | End: 2022-01-21

## 2022-01-21 RX ORDER — 0.9 % SODIUM CHLORIDE 0.9 %
1000 INTRAVENOUS SOLUTION INTRAVENOUS ONCE
Status: COMPLETED | OUTPATIENT
Start: 2022-01-21 | End: 2022-01-21

## 2022-01-21 RX ORDER — SODIUM CHLORIDE 0.9 % (FLUSH) 0.9 %
10 SYRINGE (ML) INJECTION PRN
Status: DISCONTINUED | OUTPATIENT
Start: 2022-01-21 | End: 2022-01-22 | Stop reason: SDUPTHER

## 2022-01-21 RX ADMIN — IOPAMIDOL 75 ML: 755 INJECTION, SOLUTION INTRAVENOUS at 19:24

## 2022-01-21 RX ADMIN — SODIUM CHLORIDE 1000 ML: 9 INJECTION, SOLUTION INTRAVENOUS at 17:49

## 2022-01-21 RX ADMIN — CEFTRIAXONE 1000 MG: 1 INJECTION, POWDER, FOR SOLUTION INTRAMUSCULAR; INTRAVENOUS at 21:05

## 2022-01-21 RX ADMIN — ACETAMINOPHEN 1000 MG: 500 TABLET ORAL at 18:19

## 2022-01-21 ASSESSMENT — ENCOUNTER SYMPTOMS
RHINORRHEA: 0
SORE THROAT: 0
PHOTOPHOBIA: 0
VOICE CHANGE: 0
NAUSEA: 0
ABDOMINAL PAIN: 0
VOMITING: 1
TROUBLE SWALLOWING: 0
DIARRHEA: 1
COUGH: 1

## 2022-01-21 ASSESSMENT — PAIN SCALES - GENERAL
PAINLEVEL_OUTOF10: 0
PAINLEVEL_OUTOF10: 0

## 2022-01-21 NOTE — ED PROVIDER NOTES
HPI   Patient is a 26-year-old male with medical history of hypertension, seizure, COPD, TIA and polysubstance abuse presenting to the emergency department due to worsening shortness of breath. Patient was brought in by EMS from home. Apparently, his sons wanted the patient to come in for evaluation because he has been ill at home for several days. Patient is a poor historian and cannot give a detailed history. He does admit to generalized fatigue/weakness as well as cough, diarrhea, shortness of breath and vomiting. Patient denies any inciting factors for his symptoms. No recent sick contacts or exposure to known COVID-positive individuals. Patient states he is vaccinated for SARS-CoV-2. Symptoms have been ongoing and progressively worsening over the past 3 days. No remitting or exacerbating factors noted. Review of Systems   Constitutional: Positive for fatigue. Negative for chills and fever. Generalized fatigue and weakness. HENT: Negative for congestion, rhinorrhea, sore throat, trouble swallowing and voice change. Eyes: Negative for photophobia and visual disturbance. Respiratory: Positive for cough and shortness of breath. Cardiovascular: Negative for chest pain and palpitations. Gastrointestinal: Positive for diarrhea and vomiting. Negative for abdominal pain and nausea. Genitourinary: Negative for dysuria, flank pain, frequency and urgency. Musculoskeletal: Negative for arthralgias and myalgias. Skin: Negative for rash and wound. Neurological: Negative for dizziness, seizures, weakness and headaches. Psychiatric/Behavioral: Negative for behavioral problems and confusion. Physical Exam  Constitutional:       General: He is not in acute distress. Appearance: Normal appearance. He is ill-appearing. HENT:      Head: Normocephalic and atraumatic. Eyes:      Pupils: Pupils are equal, round, and reactive to light.    Cardiovascular:      Rate and Rhythm: Normal rate and regular rhythm. Pulses: Normal pulses. Heart sounds: Normal heart sounds. Pulmonary:      Effort: Pulmonary effort is normal. No respiratory distress. Breath sounds: Decreased breath sounds present. No wheezing or rales. Abdominal:      Tenderness: There is no abdominal tenderness. There is no guarding or rebound. Musculoskeletal:      Cervical back: Normal range of motion and neck supple. Skin:     General: Skin is warm. Comments: Clammy skin   Neurological:      General: No focal deficit present. Mental Status: He is alert and oriented to person, place, and time. Cranial Nerves: No cranial nerve deficit. Coordination: Coordination normal.          Procedures   EKG: This EKG is signed and interpreted by me. Sinus tachycardia with a ventricular rate of 113 bpm.  Normal axis. MT interval normal.  QTC prolonged at 482ms. T wave flattening in lead V3. No evidence of acute ST elevation MI. Changes compared to previous EKG on 8/23/2021. MDM   Patient is a 71-year-old male with past medical history of hypertension, seizure, COPD, TIA and polysubstance abuse presenting the emergency department due to worsening shortness of breath. Patient was brought in by ambulance from home and noted to be hypoxic with a SPO2 of 88% on room air in route to the ED. On initial evaluation, patient was seen to be hypoxic with saturation as low as 86%. He was started on supplemental oxygen via nasal cannula at 4 L with improvement in his saturations. Patient does not require oxygen at baseline. He was complaining of multiple symptoms including worsening fatigue, cough, shortness of breath and diarrhea at home. Symptoms started several days ago and have progressively worsened. Lab work-up in the emergency department significant for elevated CK at 1063 otherwise remaining labs unremarkable. Patient noted to be febrile with a temperature of 101.1 as well.   Patient given Tylenol and IV fluids in the emergency department. CTA was negative for PEs but showed scattered bilateral pulmonary infiltrates consistent with COVID. Patient tested negative for COVID x 2 on rapid testing however. CT abdomen pelvis reports multiple renal calculi however urinalysis appeared unremarkable. Patient was denying any urinary symptoms. Patient given IV Rocephin for prophylactic coverage of septic stone. Urology was consulted and does not believe the patient requires any emergent intervention at this time. They will see the patient as an inpatient after admission. Patient remained hemodynamically stable with appropriate bedside SPO2 on supplemental oxygen. Discussed the patient with Dr. Eric Lozano who agreed to accept the patient for admission. ED Course as of 01/22/22 0242   Leata Bumpers Jan 21, 2022 2238 Spoke with Dr. Homer Ramirez who agreed to admit the patient. [PP]      ED Course User Index  [PP] Karina Layne, DO          --------------------------------------------- PAST HISTORY ---------------------------------------------  Past Medical History:  has a past medical history of Back pain, chronic, Brain tumor ([facility] [address] Chest pain, Herniated disc, Marijuana use, Seizure disorder ([facility] [address] Seizures ([facility] [address] Tobacco abuse, Tobacco abuse, and Unspecified cerebral artery occlusion with cerebral infarction. Past Surgical History:  has a past surgical history that includes Eye surgery (Bilateral); Tonsillectomy; External ear surgery; cervical fusion; and brain surgery. Social History:  reports that he has been smoking cigarettes. He has a 20.00 pack-year smoking history. He has never used smokeless tobacco. He reports that he does not drink alcohol and does not use drugs. Family History: family history includes Seizures in his son; Substance Abuse in his father. The patients home medications have been reviewed.     Allergies: Tramadol    -------------------------------------------------- RESULTS -------------------------------------------------    LABS:  Results for orders placed or performed during the hospital encounter of 01/21/22   COVID-19, Rapid    Specimen: Nasopharyngeal Swab   Result Value Ref Range    SARS-CoV-2, NAAT Not Detected Not Detected   COVID-19, Rapid    Specimen: Nasopharyngeal Swab   Result Value Ref Range    SARS-CoV-2, NAAT Not Detected Not Detected   CBC Auto Differential   Result Value Ref Range    WBC 8.7 4.5 - 11.5 E9/L    RBC 4.40 3.80 - 5.80 E12/L    Hemoglobin 13.2 12.5 - 16.5 g/dL    Hematocrit 40.2 37.0 - 54.0 %    MCV 91.4 80.0 - 99.9 fL    MCH 30.0 26.0 - 35.0 pg    MCHC 32.8 32.0 - 34.5 %    RDW 14.0 11.5 - 15.0 fL    Platelets 899 350 - 067 E9/L    MPV 10.4 7.0 - 12.0 fL    Neutrophils % 84.8 (H) 43.0 - 80.0 %    Immature Granulocytes % 1.6 0.0 - 5.0 %    Lymphocytes % 5.2 (L) 20.0 - 42.0 %    Monocytes % 8.3 2.0 - 12.0 %    Eosinophils % 0.0 0.0 - 6.0 %    Basophils % 0.1 0.0 - 2.0 %    Neutrophils Absolute 7.35 (H) 1.80 - 7.30 E9/L    Immature Granulocytes # 0.14 E9/L    Lymphocytes Absolute 0.45 (L) 1.50 - 4.00 E9/L    Monocytes Absolute 0.72 0.10 - 0.95 E9/L    Eosinophils Absolute 0.00 (L) 0.05 - 0.50 E9/L    Basophils Absolute 0.01 0.00 - 0.20 E9/L    Polychromasia 1+     Hypochromia 1+     Ovalocytes 1+     Spherocytes 1+    Comprehensive Metabolic Panel w/ Reflex to MG   Result Value Ref Range    Sodium 139 132 - 146 mmol/L    Potassium reflex Magnesium 3.6 3.5 - 5.0 mmol/L    Chloride 101 98 - 107 mmol/L    CO2 24 22 - 29 mmol/L    Anion Gap 14 7 - 16 mmol/L    Glucose 111 (H) 74 - 99 mg/dL    BUN 16 6 - 23 mg/dL    CREATININE 1.1 0.7 - 1.2 mg/dL    GFR Non-African American >60 >=60 mL/min/1.73    GFR African American >60     Calcium 9.1 8.6 - 10.2 mg/dL    Total Protein 6.7 6.4 - 8.3 g/dL    Albumin 3.6 3.5 - 5.2 g/dL    Total Bilirubin 0.5 0.0 - 1.2 mg/dL    Alkaline Phosphatase 69 40 - 129 U/L    ALT 44 (H) 0 - 40 U/L    AST 56 (H) 0 - 39 U/L   Troponin Result Value Ref Range    Troponin, High Sensitivity 39 (H) 0 - 11 ng/L   Brain Natriuretic Peptide   Result Value Ref Range    Pro-BNP 48 0 - 125 pg/mL   Urinalysis, reflex to microscopic   Result Value Ref Range    Color, UA Yellow Straw/Yellow    Clarity, UA Clear Clear    Glucose, Ur Negative Negative mg/dL    Bilirubin Urine Negative Negative    Ketones, Urine TRACE (A) Negative mg/dL    Specific Gravity, UA 1.020 1.005 - 1.030    Blood, Urine MODERATE (A) Negative    pH, UA 6.0 5.0 - 9.0    Protein,  (A) Negative mg/dL    Urobilinogen, Urine 1.0 <2.0 E.U./dL    Nitrite, Urine Negative Negative    Leukocyte Esterase, Urine Negative Negative   Lactate, Sepsis   Result Value Ref Range    Lactic Acid, Sepsis 1.6 0.5 - 1.9 mmol/L   Lipase   Result Value Ref Range    Lipase 82 (H) 13 - 60 U/L   Serum Drug Screen   Result Value Ref Range    Ethanol Lvl <10 mg/dL    Acetaminophen Level <5.0 (L) 10.0 - 53.4 mcg/mL    Salicylate, Serum <5.4 0.0 - 30.0 mg/dL    TCA Scrn NEGATIVE Cutoff:300 ng/mL   URINE DRUG SCREEN   Result Value Ref Range    Amphetamine Screen, Urine NOT DETECTED Negative <1000 ng/mL    Barbiturate Screen, Ur NOT DETECTED Negative < 200 ng/mL    Benzodiazepine Screen, Urine NOT DETECTED Negative < 200 ng/mL    Cannabinoid Scrn, Ur NOT DETECTED Negative < 50ng/mL    Cocaine Metabolite Screen, Urine NOT DETECTED Negative < 300 ng/mL    Opiate Scrn, Ur NOT DETECTED Negative < 300ng/mL    PCP Screen, Urine NOT DETECTED Negative < 25 ng/mL    Methadone Screen, Urine NOT DETECTED Negative <300 ng/mL    Oxycodone Urine NOT DETECTED Negative <100 ng/mL    FENTANYL SCREEN, URINE NOT DETECTED Negative <1 ng/mL    Drug Screen Comment: see below    CK   Result Value Ref Range    Total CK 1,063 (H) 20 - 200 U/L   Troponin   Result Value Ref Range    Troponin, High Sensitivity 45 (H) 0 - 11 ng/L   Microscopic Urinalysis   Result Value Ref Range    WBC, UA 0-1 0 - 5 /HPF    RBC, UA 1-3 0 - 2 /HPF    Bacteria, UA NONE SEEN None Seen /HPF   EKG 12 Lead   Result Value Ref Range    Ventricular Rate 113 BPM    Atrial Rate 113 BPM    P-R Interval 152 ms    QRS Duration 102 ms    Q-T Interval 352 ms    QTc Calculation (Bazett) 482 ms    P Axis 73 degrees    R Axis 39 degrees    T Axis 66 degrees       RADIOLOGY:  CTA PULMONARY W CONTRAST   Final Result   CTA OF THE CHEST:      1. No pulmonary embolism. 2. Mild-to-moderate volume scattered bilateral pulmonary infiltrates   consistent with atypical pneumonia, possibly COVID 19.   3. Moderate noncalcified and ulcerated plaque in the thoracic and abdominal   aorta. No aneurysm or dissection. CT OF THE ABDOMEN AND PELVIS:      1. Liquid stool in the colon indicates a diarrheal illness. No bowel wall   thickening or obstruction noted. 2. 3 mm calculus along the proximal right ureter which may represent a   nonobstructive ureteral calculus or an adjacent phlebolith. There is no   distension the proximal ureter. 3. Multiple intrarenal calculi bilaterally, ranging in size from 1-5 mm. 4. Moderate sigmoid diverticulosis without diverticulitis. 5. Avascular necrosis along the articular surfaces of the femoral heads. No   evidence of cortical collapse. RECOMMENDATIONS:   Unavailable         CT ABDOMEN PELVIS W IV CONTRAST Additional Contrast? None   Final Result   CTA OF THE CHEST:      1. No pulmonary embolism. 2. Mild-to-moderate volume scattered bilateral pulmonary infiltrates   consistent with atypical pneumonia, possibly COVID 19.   3. Moderate noncalcified and ulcerated plaque in the thoracic and abdominal   aorta. No aneurysm or dissection. CT OF THE ABDOMEN AND PELVIS:      1. Liquid stool in the colon indicates a diarrheal illness. No bowel wall   thickening or obstruction noted. 2. 3 mm calculus along the proximal right ureter which may represent a   nonobstructive ureteral calculus or an adjacent phlebolith.   There is no   distension the proximal ureter. 3. Multiple intrarenal calculi bilaterally, ranging in size from 1-5 mm. 4. Moderate sigmoid diverticulosis without diverticulitis. 5. Avascular necrosis along the articular surfaces of the femoral heads. No   evidence of cortical collapse. RECOMMENDATIONS:   Unavailable         XR CHEST PORTABLE   Final Result   No acute process. CT HEAD WO CONTRAST    (Results Pending)       ------------------------- NURSING NOTES AND VITALS REVIEWED ---------------------------  Date / Time Roomed:  1/21/2022  3:14 PM  ED Bed Assignment:  36/36    The nursing notes within the ED encounter and vital signs as below have been reviewed. Patient Vitals for the past 24 hrs:   BP Temp Temp src Pulse Resp SpO2   01/22/22 0100 130/79 -- -- 84 -- 96 %   01/22/22 0000 133/82 -- -- 79 -- 95 %   01/21/22 2300 123/67 -- -- 62 -- 95 %   01/21/22 2200 116/76 -- -- 70 -- 95 %   01/21/22 2100 (!) 91/42 -- -- 84 -- 94 %   01/21/22 2038 -- -- -- -- -- (!) 88 %   01/21/22 2037 126/65 98.6 °F (37 °C) Oral 77 17 92 %   01/21/22 1930 108/62 -- -- 85 18 97 %   01/21/22 1819 (!) 147/92 -- -- 110 18 97 %   01/21/22 1758 -- 101.1 °F (38.4 °C) Oral -- -- --   01/21/22 1517 (!) 169/88 -- -- 114 18 94 %       Oxygen Saturation Interpretation: Improved after treatment    ------------------------------------------ PROGRESS NOTES ------------------------------------------  Counseling:  I have spoken with the patient and discussed todays results, in addition to providing specific details for the plan of care and counseling regarding the diagnosis and prognosis. Their questions are answered at this time and they are agreeable with the plan of admission.    --------------------------------- ADDITIONAL PROVIDER NOTES ---------------------------------  Consultations:  Time: 2238. Spoke with Dr. Josee Hawkins. Discussed case. They will admit the patient.   This patient's ED course included: a personal history and physicial examination, re-evaluation prior to disposition, multiple bedside re-evaluations, IV medications, cardiac monitoring and continuous pulse oximetry    This patient has remained hemodynamically stable during their ED course. Diagnosis:  1. Shortness of breath    2. Non-traumatic rhabdomyolysis    3. Acute respiratory failure with hypoxia (HCC)        Disposition:  Patient's disposition: Admit to telemetry  Patient's condition is stable. Justice Wheatley DO  Resident  01/22/22 9218    ATTENDING PROVIDER ATTESTATION:     Evan Gómez presented to the emergency department for evaluation of Shortness of Breath (EMS called for SOB found patient in bathtub patient states he was sitting on edge and fell backwards unknown LOC. denies blood thinners. Per EMS patient 88% on RA. 94% on 3L NC. Patient states he has been sick with fatigue and diarrhea x 3 days ), Fall, and Diarrhea   and was initially evaluated by the Medical Resident. See Original ED Note for H&P and ED course above. I have reviewed and discussed the case, including pertinent history (medical, surgical, family and social) and exam findings with the Medical Resident assigned to Evan Gómez. I have personally performed and/or participated in the history, exam, medical decision making, and procedures and agree with all pertinent clinical information. I have reviewed my findings and recommendations with the assigned Medical Resident, Evan Gómez and members of family present at the time of disposition. My findings/plan: The primary encounter diagnosis was Shortness of breath. A diagnosis of Non-traumatic rhabdomyolysis was also pertinent to this visit.   Current Discharge Medication List        Critical Care Time: 44 minutes     MD Luisa Nuñez MD  01/24/22 4635

## 2022-01-22 ENCOUNTER — APPOINTMENT (OUTPATIENT)
Dept: CT IMAGING | Age: 67
DRG: 193 | End: 2022-01-22
Payer: OTHER GOVERNMENT

## 2022-01-22 PROBLEM — J96.01 ACUTE RESPIRATORY FAILURE WITH HYPOXIA (HCC): Status: ACTIVE | Noted: 2022-01-22

## 2022-01-22 PROBLEM — M62.82 RHABDOMYOLYSIS: Status: ACTIVE | Noted: 2022-01-22
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
## 2022-01-22 LAB
EKG ATRIAL RATE: 113 BPM
EKG P AXIS: 73 DEGREES
EKG P-R INTERVAL: 152 MS
EKG Q-T INTERVAL: 352 MS
EKG QRS DURATION: 102 MS
EKG QTC CALCULATION (BAZETT): 482 MS
EKG R AXIS: 39 DEGREES
EKG T AXIS: 66 DEGREES
EKG VENTRICULAR RATE: 113 BPM
PROCALCITONIN: 0.12 NG/ML (ref 0–0.08)
PROCALCITONIN: 0.12 NG/ML (ref 0–0.08)
REASON FOR REJECTION: NORMAL
REASON FOR REJECTION: NORMAL
REJECTED TEST: NORMAL
REJECTED TEST: NORMAL
TOTAL CK: 729 U/L (ref 20–200)
TROPONIN, HIGH SENSITIVITY: 37 NG/L (ref 0–11)

## 2022-01-22 PROCEDURE — 6370000000 HC RX 637 (ALT 250 FOR IP): Performed by: INTERNAL MEDICINE

## 2022-01-22 PROCEDURE — 2580000003 HC RX 258: Performed by: INTERNAL MEDICINE

## 2022-01-22 PROCEDURE — 94640 AIRWAY INHALATION TREATMENT: CPT

## 2022-01-22 PROCEDURE — 70450 CT HEAD/BRAIN W/O DYE: CPT

## 2022-01-22 PROCEDURE — 6360000002 HC RX W HCPCS: Performed by: INTERNAL MEDICINE

## 2022-01-22 PROCEDURE — 93010 ELECTROCARDIOGRAM REPORT: CPT | Performed by: INTERNAL MEDICINE

## 2022-01-22 PROCEDURE — 84484 ASSAY OF TROPONIN QUANT: CPT

## 2022-01-22 PROCEDURE — 2060000000 HC ICU INTERMEDIATE R&B

## 2022-01-22 PROCEDURE — 84145 PROCALCITONIN (PCT): CPT

## 2022-01-22 PROCEDURE — 94664 DEMO&/EVAL PT USE INHALER: CPT

## 2022-01-22 PROCEDURE — 36415 COLL VENOUS BLD VENIPUNCTURE: CPT

## 2022-01-22 PROCEDURE — 2700000000 HC OXYGEN THERAPY PER DAY

## 2022-01-22 PROCEDURE — 82550 ASSAY OF CK (CPK): CPT

## 2022-01-22 RX ORDER — LAMOTRIGINE 25 MG/1
50 TABLET ORAL
Status: DISCONTINUED | OUTPATIENT
Start: 2022-01-23 | End: 2022-01-24 | Stop reason: HOSPADM

## 2022-01-22 RX ORDER — BUSPIRONE HYDROCHLORIDE 10 MG/1
10 TABLET ORAL 3 TIMES DAILY
Status: DISCONTINUED | OUTPATIENT
Start: 2022-01-22 | End: 2022-01-24 | Stop reason: HOSPADM

## 2022-01-22 RX ORDER — SODIUM CHLORIDE 0.9 % (FLUSH) 0.9 %
5-40 SYRINGE (ML) INJECTION PRN
Status: DISCONTINUED | OUTPATIENT
Start: 2022-01-22 | End: 2022-01-24 | Stop reason: HOSPADM

## 2022-01-22 RX ORDER — LAMOTRIGINE 25 MG/1
75 TABLET ORAL EVERY EVENING
Status: DISCONTINUED | OUTPATIENT
Start: 2022-01-22 | End: 2022-01-24 | Stop reason: HOSPADM

## 2022-01-22 RX ORDER — ACETAMINOPHEN 650 MG/1
650 SUPPOSITORY RECTAL EVERY 6 HOURS PRN
Status: DISCONTINUED | OUTPATIENT
Start: 2022-01-22 | End: 2022-01-24

## 2022-01-22 RX ORDER — PROPRANOLOL HYDROCHLORIDE 20 MG/1
40 TABLET ORAL 2 TIMES DAILY
Status: DISCONTINUED | OUTPATIENT
Start: 2022-01-22 | End: 2022-01-24 | Stop reason: HOSPADM

## 2022-01-22 RX ORDER — BUDESONIDE 0.5 MG/2ML
500 INHALANT ORAL 2 TIMES DAILY
Status: DISCONTINUED | OUTPATIENT
Start: 2022-01-22 | End: 2022-01-24 | Stop reason: HOSPADM

## 2022-01-22 RX ORDER — SODIUM CHLORIDE 9 MG/ML
25 INJECTION, SOLUTION INTRAVENOUS PRN
Status: DISCONTINUED | OUTPATIENT
Start: 2022-01-22 | End: 2022-01-24 | Stop reason: HOSPADM

## 2022-01-22 RX ORDER — PREDNISONE 20 MG/1
20 TABLET ORAL 2 TIMES DAILY
Status: DISCONTINUED | OUTPATIENT
Start: 2022-01-24 | End: 2022-01-24

## 2022-01-22 RX ORDER — ARFORMOTEROL TARTRATE 15 UG/2ML
15 SOLUTION RESPIRATORY (INHALATION) 2 TIMES DAILY
Status: DISCONTINUED | OUTPATIENT
Start: 2022-01-22 | End: 2022-01-24 | Stop reason: HOSPADM

## 2022-01-22 RX ORDER — SODIUM CHLORIDE 0.9 % (FLUSH) 0.9 %
5-40 SYRINGE (ML) INJECTION EVERY 12 HOURS SCHEDULED
Status: DISCONTINUED | OUTPATIENT
Start: 2022-01-22 | End: 2022-01-24 | Stop reason: HOSPADM

## 2022-01-22 RX ORDER — ONDANSETRON 4 MG/1
4 TABLET, ORALLY DISINTEGRATING ORAL EVERY 8 HOURS PRN
Status: DISCONTINUED | OUTPATIENT
Start: 2022-01-22 | End: 2022-01-24 | Stop reason: HOSPADM

## 2022-01-22 RX ORDER — LEVETIRACETAM 500 MG/1
750 TABLET ORAL 2 TIMES DAILY
Status: DISCONTINUED | OUTPATIENT
Start: 2022-01-22 | End: 2022-01-24 | Stop reason: HOSPADM

## 2022-01-22 RX ORDER — AMLODIPINE BESYLATE 5 MG/1
5 TABLET ORAL DAILY
Status: DISCONTINUED | OUTPATIENT
Start: 2022-01-22 | End: 2022-01-24 | Stop reason: HOSPADM

## 2022-01-22 RX ORDER — ASPIRIN 81 MG/1
81 TABLET ORAL DAILY
Status: DISCONTINUED | OUTPATIENT
Start: 2022-01-22 | End: 2022-01-24 | Stop reason: HOSPADM

## 2022-01-22 RX ORDER — ACETAMINOPHEN 325 MG/1
650 TABLET ORAL EVERY 6 HOURS PRN
Status: DISCONTINUED | OUTPATIENT
Start: 2022-01-22 | End: 2022-01-24

## 2022-01-22 RX ORDER — DULOXETIN HYDROCHLORIDE 60 MG/1
60 CAPSULE, DELAYED RELEASE ORAL DAILY
Status: DISCONTINUED | OUTPATIENT
Start: 2022-01-22 | End: 2022-01-24 | Stop reason: HOSPADM

## 2022-01-22 RX ORDER — IPRATROPIUM BROMIDE AND ALBUTEROL SULFATE 2.5; .5 MG/3ML; MG/3ML
1 SOLUTION RESPIRATORY (INHALATION)
Status: DISCONTINUED | OUTPATIENT
Start: 2022-01-22 | End: 2022-01-24 | Stop reason: HOSPADM

## 2022-01-22 RX ORDER — AZITHROMYCIN 250 MG/1
500 TABLET, FILM COATED ORAL DAILY
Status: COMPLETED | OUTPATIENT
Start: 2022-01-22 | End: 2022-01-24

## 2022-01-22 RX ORDER — CLONAZEPAM 0.5 MG/1
0.25 TABLET ORAL 2 TIMES DAILY PRN
Status: DISCONTINUED | OUTPATIENT
Start: 2022-01-22 | End: 2022-01-24 | Stop reason: HOSPADM

## 2022-01-22 RX ORDER — METHYLPREDNISOLONE SODIUM SUCCINATE 40 MG/ML
40 INJECTION, POWDER, LYOPHILIZED, FOR SOLUTION INTRAMUSCULAR; INTRAVENOUS EVERY 12 HOURS
Status: COMPLETED | OUTPATIENT
Start: 2022-01-22 | End: 2022-01-23

## 2022-01-22 RX ORDER — GABAPENTIN 300 MG/1
600 CAPSULE ORAL 4 TIMES DAILY
Status: DISCONTINUED | OUTPATIENT
Start: 2022-01-22 | End: 2022-01-24 | Stop reason: HOSPADM

## 2022-01-22 RX ORDER — ONDANSETRON 2 MG/ML
4 INJECTION INTRAMUSCULAR; INTRAVENOUS EVERY 6 HOURS PRN
Status: DISCONTINUED | OUTPATIENT
Start: 2022-01-22 | End: 2022-01-24 | Stop reason: HOSPADM

## 2022-01-22 RX ADMIN — LAMOTRIGINE 75 MG: 25 TABLET ORAL at 18:05

## 2022-01-22 RX ADMIN — METHYLPREDNISOLONE SODIUM SUCCINATE 40 MG: 40 INJECTION, POWDER, FOR SOLUTION INTRAMUSCULAR; INTRAVENOUS at 05:51

## 2022-01-22 RX ADMIN — ARFORMOTEROL TARTRATE 15 MCG: 15 SOLUTION RESPIRATORY (INHALATION) at 19:06

## 2022-01-22 RX ADMIN — LEVETIRACETAM 750 MG: 500 TABLET, FILM COATED ORAL at 21:26

## 2022-01-22 RX ADMIN — AZITHROMYCIN 500 MG: 250 TABLET, FILM COATED ORAL at 09:16

## 2022-01-22 RX ADMIN — IPRATROPIUM BROMIDE AND ALBUTEROL SULFATE 1 AMPULE: .5; 2.5 SOLUTION RESPIRATORY (INHALATION) at 08:40

## 2022-01-22 RX ADMIN — IPRATROPIUM BROMIDE AND ALBUTEROL SULFATE 1 AMPULE: .5; 2.5 SOLUTION RESPIRATORY (INHALATION) at 19:06

## 2022-01-22 RX ADMIN — GABAPENTIN 600 MG: 300 CAPSULE ORAL at 09:17

## 2022-01-22 RX ADMIN — ENOXAPARIN SODIUM 40 MG: 100 INJECTION SUBCUTANEOUS at 09:16

## 2022-01-22 RX ADMIN — GABAPENTIN 600 MG: 300 CAPSULE ORAL at 21:28

## 2022-01-22 RX ADMIN — PROPRANOLOL HYDROCHLORIDE 40 MG: 20 TABLET ORAL at 09:17

## 2022-01-22 RX ADMIN — LEVETIRACETAM 750 MG: 500 TABLET, FILM COATED ORAL at 09:17

## 2022-01-22 RX ADMIN — BUDESONIDE 500 MCG: 0.5 SUSPENSION RESPIRATORY (INHALATION) at 19:06

## 2022-01-22 RX ADMIN — Medication 10 ML: at 21:29

## 2022-01-22 RX ADMIN — GABAPENTIN 600 MG: 300 CAPSULE ORAL at 18:05

## 2022-01-22 RX ADMIN — DULOXETINE HYDROCHLORIDE 60 MG: 60 CAPSULE, DELAYED RELEASE ORAL at 09:16

## 2022-01-22 RX ADMIN — Medication 10 ML: at 09:18

## 2022-01-22 RX ADMIN — AMLODIPINE BESYLATE 5 MG: 5 TABLET ORAL at 09:15

## 2022-01-22 RX ADMIN — BUDESONIDE 500 MCG: 0.5 SUSPENSION RESPIRATORY (INHALATION) at 08:40

## 2022-01-22 RX ADMIN — ARFORMOTEROL TARTRATE 15 MCG: 15 SOLUTION RESPIRATORY (INHALATION) at 08:40

## 2022-01-22 RX ADMIN — CEFTRIAXONE 1000 MG: 1 INJECTION, POWDER, FOR SOLUTION INTRAMUSCULAR; INTRAVENOUS at 21:27

## 2022-01-22 RX ADMIN — PROPRANOLOL HYDROCHLORIDE 40 MG: 20 TABLET ORAL at 21:28

## 2022-01-22 RX ADMIN — METHYLPREDNISOLONE SODIUM SUCCINATE 40 MG: 40 INJECTION, POWDER, FOR SOLUTION INTRAMUSCULAR; INTRAVENOUS at 15:59

## 2022-01-22 ASSESSMENT — ENCOUNTER SYMPTOMS: SHORTNESS OF BREATH: 1

## 2022-01-22 NOTE — H&P
Hospital Medicine History & Physical      PCP: SAI TRUJILLO - CNP    Date of Admission: 1/21/2022    Date of Service: Pt seen/examined on 1/22 and Admitted to Inpatient with expected LOS greater than two midnights due to medical therapy. Chief Complaint: Dyspnea      History Of Present Illness: 77 y.o. male who presented to [facility] with shortness of breath. Patient presents the ED with complaints of trouble breathing. He is also been feeling very tired. He states he just wants to sleep. No chest pain or palpitations. No fevers or chills.  + Occasional wheezing. Denies COVID exposure. States he has been vaccinated. Notes generalized weakness, fatigue. No focal weakness numbness vision changes or speech difficulty. In ED patient's found to be hypoxic with pulse ox of 88% on room air. He had fever of 101. 1. He does note loose stool. Patient notes that he did fall couple days ago. He cannot member exactly when. He did hit his head. Denies loss of consciousness. No neck or back or extremity injury. He states he thinks he tripped. Pt is a poor historian. History is largely obtained from chart review and discussions with staff/family as available. Past Medical History:          Diagnosis Date    Back pain, chronic     Brain tumor ([facility] [address] unk    Chest pain 9/16/2014    Herniated disc     Marijuana use 9/16/2014    Seizure disorder (HCC)     Seizures (HCC)     Tobacco abuse     Tobacco abuse 9/16/2014    Unspecified cerebral artery occlusion with cerebral infarction        Past Surgical History:          Procedure Laterality Date    BRAIN SURGERY      CERVICAL FUSION      EXTERNAL EAR SURGERY      EYE SURGERY Bilateral     TONSILLECTOMY         Medications Prior to Admission:      Prior to Admission medications    Medication Sig Start Date End Date Taking?  Authorizing Provider   DULoxetine (CYMBALTA) 60 MG extended release capsule Take 1 capsule by mouth daily 7/24/20   Antonina Arguello, DO   acetaminophen (TYLENOL) 325 MG tablet Take 2 tablets by mouth every 6 hours as needed for Pain 7/24/20   Luis F Arguello, DO   tobramycin-dexamethasone (TOBRADEX) 0.3-0.1 % ophthalmic ointment Place into both eyes nightly    Historical Provider, MD   prednisoLONE acetate (PRED FORTE) 1 % ophthalmic suspension Place 1 drop into both eyes 3 times daily    Historical Provider, MD   clonazePAM (KLONOPIN) 1 MG tablet Take 1 mg by mouth 2 times daily as needed. Historical Provider, MD   ketorolac (TORADOL) 10 MG tablet Take 10 mg by mouth every 6 hours as needed for Pain    Historical Provider, MD   propranolol (INDERAL) 40 MG tablet Take 40 mg by mouth 2 times daily    Historical Provider, MD   amLODIPine (NORVASC) 5 MG tablet Take 5 mg by mouth daily    Historical Provider, MD   busPIRone (BUSPAR) 10 MG tablet Take 10 mg by mouth 3 times daily    Historical Provider, MD   lamoTRIgine (LAMICTAL) 25 MG tablet Take 25 mg by mouth See Admin Instructions 50 mg - AM  75 mg - PM    Historical Provider, MD   aspirin 81 MG EC tablet Take 81 mg by mouth daily    Historical Provider, MD   albuterol (PROVENTIL) (5 MG/ML) 0.5% nebulizer solution Take 0.5 mLs by nebulization every 6 hours as needed for Wheezing 10/4/17   Nneka Flores APRN - KESHAV   fluticasone-vilanterol (BREO ELLIPTA) 100-25 MCG/INH AEPB inhaler Inhale 1 puff into the lungs daily 9/28/17   Emily Anderson MD   albuterol sulfate HFA (PROAIR HFA) 108 (90 Base) MCG/ACT inhaler Inhale 2 puffs into the lungs every 6 hours as needed for Wheezing 9/28/17   Emily Anderson MD   vardenafil (LEVITRA) 20 MG tablet Take 20 mg by mouth as needed for Erectile Dysfunction    Historical Provider, MD   levETIRAcetam (KEPPRA) 750 MG tablet Take 1 tablet by mouth 2 times daily 6/26/17   Wilda Krishnamurthy MD   gabapentin (NEURONTIN) 800 MG tablet Take 800 mg by mouth 4 times daily.      Historical Provider, MD       Allergies: Tramadol    Social History:      The patient currently lives home    TOBACCO:   reports that he has been smoking cigarettes. He has a 20.00 pack-year smoking history. He has never used smokeless tobacco.  ETOH:   reports no history of alcohol use. Family History:            Problem Relation Age of Onset    Substance Abuse Father         alcohol    Seizures Son        REVIEW OF SYSTEMS:   Pertinent positives as noted in the HPI. All other systems reviewed and negative. PHYSICAL EXAM:    /79   Pulse 84   Temp 98.6 °F (37 °C) (Oral)   Resp 17   SpO2 96%     General appearance:  No apparent distress, appears stated age and cooperative. HEENT:  Normal cephalic, atraumatic without obvious deformity. Pupils equal, round, and reactive to light. Extra ocular muscles intact. Conjunctivae/corneas clear. Neck: Supple, with full range of motion. No jugular venous distention. Trachea midline. Respiratory:  Normal respiratory effort. Clear to auscultation, bilaterally without Rales/Wheezes/Rhonchi. Cardiovascular:  Regular rate and rhythm with normal S1/S2 without murmurs, rubs or gallops. Abdomen: Soft, non-tender, non-distended with normal bowel sounds. Musculoskeletal:  No clubbing, cyanosis or edema bilaterally. Full range of motion without deformity. Skin: Skin color, texture, turgor normal.  No rashes or lesions. Neurologic:  Neurovascularly intact without any focal sensory/motor deficits.  Cranial nerves: II-XII intact, grossly non-focal.  Psychiatric:  Alert and oriented x2, thought content appropriate, normal insight    CXR:  I have reviewed the CXR with the following interpretation: NAD  EKG:  I have reviewed the EKG with the following interpretation: Sinus tachycardia    Labs:     Recent Labs     01/21/22  1542   WBC 8.7   HGB 13.2   HCT 40.2        Recent Labs     01/21/22  1542      K 3.6      CO2 24   BUN 16   CREATININE 1.1   CALCIUM 9.1     Recent Labs 01/21/22  1542   AST 56*   ALT 44*   BILITOT 0.5   ALKPHOS 69     No results for input(s): INR in the last 72 hours. Recent Labs     01/21/22  1745   CKTOTAL 1,063*       Urinalysis:      Lab Results   Component Value Date    NITRU Negative 01/21/2022    WBCUA 0-1 01/21/2022    WBCUA 10-20 08/21/2011    BACTERIA NONE SEEN 01/21/2022    RBCUA 1-3 01/21/2022    RBCUA 0-1 08/21/2011    BLOODU MODERATE 01/21/2022    SPECGRAV 1.020 01/21/2022    GLUCOSEU Negative 01/21/2022    GLUCOSEU NEGATIVE 08/21/2011         ASSESSMENT:    Active Hospital Problems    Diagnosis Date Noted    Acute respiratory failure with hypoxia ([facility] [address] [J96.01] 01/22/2022    Rhabdomyolysis [M62.82] 01/22/2022    Fall [M42. XXXA] 01/22/2022    Shortness of breath [R06.02] 01/21/2022    Chronic obstructive pulmonary disease with acute exacerbation ([facility] [address] [J44.1] 09/27/2017    Tobacco abuse [Z72.0] 09/16/2014    Hypertension [I10] 08/21/2011       PLAN:  COPD exacerbation//respiratory failure with hypoxia/atypical pneumonia  Admit  IV steroids  Nebulizers  IV ABX  proCalcitonin   COVID-negative    Elevated troponin in context elevated CK and hypoxia  Without chest pain or acute ischemic changes on EKG-  Cycle troponin    suspected mild rhabdomyolysis/fall  Abrasion/contusion on forehead  Check CT head  Monitor CK    Medications for other co morbidities cont as appropriate w dosage adjustments as necessary    DVT Prophylaxis: Lovenox  Diet: Regular  Code Status: Full    PT/OT Eval Status: Ordered    Dispo -requires inpatient admission       Cassie Lau MD    Thank you SAI TRUJILLO - KESHAV for the opportunity to be involved in this patient's care. If you have any questions or concerns please feel free to contact me through [address] [place] [address].

## 2022-01-22 NOTE — CONSULTS
1/22/2022 7:33 AM  Service: Urology  Group: [facility] ([facility])    Stella Arriaza  [id number]     Chief Complaint:    Fatigue, shortness of breath    History of Present Illness: The patient is a 77 y.o. male patient who presents with fatigue and weakness, shortness of breath. He reports generalized weakness. He was noted to have an o2 sat of 88% on RA. He reports diarrhea a few days ago. He fell a few days ago. He is a very poor historian. He reports he has seen a urologist at the [place] in the past but does not remember why. He states he normally voids well with a good stream and gets up about 1 time a night to urinate. He has never had a kidney stone. He has no abdominal or flank pain. He is hungry and thirsty. Hx polysubstance abuse. Past Medical History:   Diagnosis Date    Back pain, chronic     Brain tumor ([address] unk    Chest pain 9/16/2014    Herniated disc     Marijuana use 9/16/2014    Seizure disorder (HCC)     Seizures (HCC)     Tobacco abuse     Tobacco abuse 9/16/2014    Unspecified cerebral artery occlusion with cerebral infarction          Past Surgical History:   Procedure Laterality Date    BRAIN SURGERY      CERVICAL FUSION      EXTERNAL EAR SURGERY      EYE SURGERY Bilateral     TONSILLECTOMY         Medications Prior to Admission:    Not in a hospital admission. Allergies:    Tramadol    Social History:    reports that he has been smoking cigarettes. He has a 20.00 pack-year smoking history. He has never used smokeless tobacco. He reports that he does not drink alcohol and does not use drugs. Family History:   Non-contributory to this Urological problem  family history includes Seizures in his son; Substance Abuse in his father.     Review of Systems:  Constitutional:   Respiratory: negative for cough and hemoptysis  Cardiovascular: negative for chest pain and dyspnea  Gastrointestinal: negative for abdominal pain, diarrhea, nausea and vomiting Derm: negative for rash and skin lesion(s)  Neurological: negative for seizures and tremors  Musculoskeletal:   Endocrine: negative for diabetic symptoms including polydipsia and polyuria  Psychiatric:   : As above in the HPI, otherwise negative  All other reviews are negative    Physical Exam:     Vitals:  BP (!) 142/79   Pulse 108   Temp 97.7 °F (36.5 °C) (Oral)   Resp 17   Wt 197 lb 1.5 oz (89.4 kg)   SpO2 97%   BMI 26.73 kg/m²     General:  Awake, alert, oriented X 3. Poorly groomed. Poor historian. He does not stay on topic. HEENT:  Normocephalic, atraumatic. Pupils equal, round. No scleral icterus. No conjunctival injection. Normal lips, teeth, and gums. No nasal discharge. Small amount of dried blood to right forehead   Neck:  Supple, no masses. Heart:  tachycardia  Lungs:  No audible wheezing. Respirations symmetric and non-labored. Abdomen:  soft, nontender, No CV tenderness. Extremities:  No clubbing, cyanosis, or edema  Skin:  Warm and dry, no open lesions or rashes  Neuro: Follows commands briskly, moves all extremities  Rectal: deferred  Genitalia:  Circumcised male, no lesions or masses to scrotum or penis    Labs:     Recent Labs     01/21/22  1542   WBC 8.7   RBC 4.40   HGB 13.2   HCT 40.2   MCV 91.4   MCH 30.0   MCHC 32.8   RDW 14.0      MPV 10.4     Results for Hunter Kern (MRN [id number]) as of 1/22/2022 07:35   Ref.  Range 1/21/2022 15:42   Sodium Latest Ref Range: 132 - 146 mmol/L 139   Potassium Latest Ref Range: 3.5 - 5.0 mmol/L 3.6   Chloride Latest Ref Range: 98 - 107 mmol/L 101   CO2 Latest Ref Range: 22 - 29 mmol/L 24   BUN Latest Ref Range: 6 - 23 mg/dL 16   Creatinine Latest Ref Range: 0.7 - 1.2 mg/dL 1.1   Anion Gap Latest Ref Range: 7 - 16 mmol/L 14   GFR Non- Latest Ref Range: >=60 mL/min/1.73 >60   GFR African American Unknown >60   Lactic Acid, Sepsis Latest Ref Range: 0.5 - 1.9 mmol/L 1.6   Glucose Latest Ref Range: 74 - 99 mg/dL 111 (H)   CALCIUM, SERUM, [id number] Latest Ref Range: 8.6 - 10.2 mg/dL 9.1   Total Protein Latest Ref Range: 6.4 - 8.3 g/dL 6.7     Results for Marcella Alford (MRN [id number]) as of 1/22/2022 07:35   Ref. Range 8/21/2021 23:34 1/21/2022 15:42 1/21/2022 17:18 1/21/2022 17:45   Total CK Latest Ref Range: 20 - 200 U/L    1,063 (H)   Pro-BNP Latest Ref Range: 0 - 125 pg/mL  48     Troponin, High Sensitivity Latest Ref Range: 0 - 11 ng/L 24 (H) 39 (H) 45 (H)      Results for Marcella Alford (MRN [id number]) as of 1/22/2022 07:35   Ref. Range 1/21/2022 15:42   Lactic Acid, Sepsis Latest Ref Range: 0.5 - 1.9 mmol/L 1.6         Results for Marcella Alford (MRN [id number]) as of 1/22/2022 07:35   Ref. Range 1/21/2022 20:57   Color, UA Latest Ref Range: Straw/Yellow  Yellow   Clarity, UA Latest Ref Range: Clear  Clear   Glucose, UA Latest Ref Range: Negative mg/dL Negative   Bilirubin, Urine Latest Ref Range: Negative  Negative   Ketones, Urine Latest Ref Range: Negative mg/dL TRACE (A)   Specific [place], UA Latest Ref Range: 1.005 - 1.030  1.020   Blood, Urine Latest Ref Range: Negative  MODERATE (A)   pH, UA Latest Ref Range: 5.0 - 9.0  6.0   Protein, UA Latest Ref Range: Negative mg/dL 100 (A)   Urobilinogen, Urine Latest Ref Range: <2.0 E.U./dL 1.0   Nitrite, Urine Latest Ref Range: Negative  Negative   Leukocyte Esterase, Urine Latest Ref Range: Negative  Negative   WBC, UA Latest Ref Range: 0 - 5 /HPF 0-1   RBC, UA Latest Ref Range: 0 - 2 /HPF 1-3   Bacteria, UA Latest Ref Range: None Seen /HPF NONE SEEN       EXAMINATION:   CT OF THE ABDOMEN AND PELVIS WITH CONTRAST; CTA OF THE CHEST 1/21/2022 7:14 pm       TECHNIQUE:   CT of the abdomen and pelvis was performed with the administration of   intravenous contrast. Multiplanar reformatted images are provided for review.    Dose modulation, iterative reconstruction, and/or weight based adjustment of   the mA/kV was utilized to reduce the radiation dose to as low as reasonably achievable.; CTA of the chest was performed after the administration of   intravenous contrast.  Multiplanar reformatted images are provided for   review.  MIP images are provided for review.  Dose modulation, iterative   reconstruction, and/or weight based adjustment of the mA/kV was utilized to   reduce the radiation dose to as low as reasonably achievable.       COMPARISON:   None.       HISTORY:   ORDERING SYSTEM PROVIDED HISTORY: ab pain, fever   TECHNOLOGIST PROVIDED HISTORY:   Reason for exam:->ab pain, fever   Additional Contrast?->None   Decision Support Exception - unselect if not a suspected or confirmed   emergency medical condition->Emergency Medical Condition (MA)   What reading provider will be dictating this exam?->CRC       FINDINGS:   CT ANGIOGRAPHY OF THE CHEST:       Pulmonary Arteries: Pulmonary arteries are adequately opacified for   evaluation.  No evidence of intraluminal filling defect to suggest pulmonary   embolism.  Main pulmonary artery is normal in caliber.       Mediastinum: The heart is normal in size.  The thoracic aorta is of normal   caliber.  Prominent noncalcified and ulcerated plaque is seen in the thoracic   aorta.  no evidence of dissection or rupture.  No mediastinal mass or   lymphadenopathy mildly prominent hilar lymph nodes are seen.       Lungs/pleura: Scattered bilateral pulmonary infiltrates are seen consistent   with, possibly COVID-19.  The central airway is clear.  No pneumothorax or   pleural effusion is seen.       Soft Tissues/Bones: No acute bone or soft tissue abnormality.       CT OF THE ABDOMEN AND PELVIS:       ORGANS:       Liver: Small cyst in the left lobe.  Otherwise, unremarkable.       Gallbladder: Unremarkable.       Pancreas: Unremarkable.       Spleen:  Unremarkable.       Adrenals: Small bilateral adrenal nodules are grossly stable as of   07/12/2018, indicating a benign etiology (adenoma).       Kidneys: Multiple intrarenal calculi bilaterally, ranging in size from 1-5   mm.  The kidneys are normal in size with symmetric nephrograms.  Along the   proximal right ureter, there is a 3 mm calculus (axial sequence, image 120). It is unclear whether it represents a ureteral calculus or a phlebolith   exerting mass effect on the right ureter.  No ureteral distension noted.       GI/BOWEL: Liquid stool in the colon indicates a diarrheal illness.  No bowel   wall thickening or obstruction noted.  Moderate sigmoid diverticulosis   without diverticulitis.       PERITONEUM/EXTRA PERITONEUM: Moderate atherosclerosis is seen in the   abdominal aorta, including moderate noncalcified and ulcerated plaque.  No   aneurysm or dissection.  No lymphadenopathy. No free air or free fluid is   seen.       PELVIS: Urinary bladder and prostate are grossly unremarkable.       BONES/SOFT TISSUES: The visualized bones are intact without fracture or focal   lesion.  Sclerosis along the femoral heads likely represents avascular   necrosis.         Impression   CTA OF THE CHEST:       1. No pulmonary embolism. 2. Mild-to-moderate volume scattered bilateral pulmonary infiltrates   consistent with atypical pneumonia, possibly COVID 19.   3. Moderate noncalcified and ulcerated plaque in the thoracic and abdominal   aorta.  No aneurysm or dissection. CT OF THE ABDOMEN AND PELVIS:       1.  Liquid stool in the colon indicates a diarrheal illness.  No bowel wall   thickening or obstruction noted. 2. 3 mm calculus along the proximal right ureter which may represent a   nonobstructive ureteral calculus or an adjacent phlebolith.  There is no   distension the proximal ureter. 3. Multiple intrarenal calculi bilaterally, ranging in size from 1-5 mm. 4. Moderate sigmoid diverticulosis without diverticulitis.    5. Avascular necrosis along the articular surfaces of the femoral heads.  No   evidence of cortical collapse.       RECOMMENDATIONS:   Unavailable Assessment/plan:  Pt presented with fever of 101, weakness and sob  His chest studies did show pneumonia  He has an incidental finding of a3mm proximal right renal stone  Asymptomatic  Urine looks negative for infection  Urine culture is pending   Fever has trended down  Will continue to monitor  Doubt stone is causing his symptoms  May need a procedure if he becomes hemodynamically unstable   Call if temp >100.5 or BP less 90 systolic     Jose F Lemus NP-[facility]                Electronically signed by SAI Cole CNP on 1/22/2022 at 7:33 AM     I agree with the nurse practitioner assessment and plan. I personally evaluated the patient and made any changes to reflect my impression and plan. Normal UA. No pain. Asymptomatic from ureteral stone. Will try for medical expulsive therapy in the setting of COVID 19 infection.    Will monitor    Amrik Mckeon MD

## 2022-01-22 NOTE — ED NOTES
Assumed care of pt at this time; received report from Chidi Hernández.; pt resting in bed eyes closed; vitals stable; needs met; call bell in reach     Grey Border, ARSEN  01/22/22 [id number]       Grey BorderARSEN  01/22/22 [id number]

## 2022-01-22 NOTE — ED NOTES
Returned from [id number] [place] [address],[facility], [address] [facility]  01/22/22 [id number]

## 2022-01-23 LAB
ALBUMIN SERPL-MCNC: 3.4 G/DL (ref 3.5–5.2)
ALP BLD-CCNC: 71 U/L (ref 40–129)
ALT SERPL-CCNC: 33 U/L (ref 0–40)
ANION GAP SERPL CALCULATED.3IONS-SCNC: 12 MMOL/L (ref 7–16)
AST SERPL-CCNC: 31 U/L (ref 0–39)
BASOPHILS ABSOLUTE: 0 E9/L (ref 0–0.2)
BASOPHILS RELATIVE PERCENT: 0.2 % (ref 0–2)
BILIRUB SERPL-MCNC: 0.3 MG/DL (ref 0–1.2)
BUN BLDV-MCNC: 21 MG/DL (ref 6–23)
BURR CELLS: ABNORMAL
CALCIUM SERPL-MCNC: 9.2 MG/DL (ref 8.6–10.2)
CHLORIDE BLD-SCNC: 103 MMOL/L (ref 98–107)
CO2: 24 MMOL/L (ref 22–29)
CREAT SERPL-MCNC: 0.8 MG/DL (ref 0.7–1.2)
EOSINOPHILS ABSOLUTE: 0 E9/L (ref 0.05–0.5)
EOSINOPHILS RELATIVE PERCENT: 0 % (ref 0–6)
GFR AFRICAN AMERICAN: >60
GFR NON-AFRICAN AMERICAN: >60 ML/MIN/1.73
GLUCOSE BLD-MCNC: 140 MG/DL (ref 74–99)
HCT VFR BLD CALC: 38.6 % (ref 37–54)
HEMOGLOBIN: 12.9 G/DL (ref 12.5–16.5)
LYMPHOCYTES ABSOLUTE: 0.17 E9/L (ref 1.5–4)
LYMPHOCYTES RELATIVE PERCENT: 1.7 % (ref 20–42)
MAGNESIUM: 2.5 MG/DL (ref 1.6–2.6)
MCH RBC QN AUTO: 30.1 PG (ref 26–35)
MCHC RBC AUTO-ENTMCNC: 33.4 % (ref 32–34.5)
MCV RBC AUTO: 90 FL (ref 80–99.9)
MONOCYTES ABSOLUTE: 0.33 E9/L (ref 0.1–0.95)
MONOCYTES RELATIVE PERCENT: 3.5 % (ref 2–12)
MYELOCYTE PERCENT: 0.9 % (ref 0–0)
NEUTROPHILS ABSOLUTE: 7.89 E9/L (ref 1.8–7.3)
NEUTROPHILS RELATIVE PERCENT: 93.9 % (ref 43–80)
PDW BLD-RTO: 13.8 FL (ref 11.5–15)
PHOSPHORUS: 3 MG/DL (ref 2.5–4.5)
PLATELET # BLD: 343 E9/L (ref 130–450)
PMV BLD AUTO: 10.7 FL (ref 7–12)
POIKILOCYTES: ABNORMAL
POTASSIUM REFLEX MAGNESIUM: 3.9 MMOL/L (ref 3.5–5)
POTASSIUM SERPL-SCNC: 3.9 MMOL/L (ref 3.5–5)
PROCALCITONIN: 0.13 NG/ML (ref 0–0.08)
RBC # BLD: 4.29 E12/L (ref 3.8–5.8)
SODIUM BLD-SCNC: 139 MMOL/L (ref 132–146)
TOTAL CK: 347 U/L (ref 20–200)
TOTAL PROTEIN: 6.3 G/DL (ref 6.4–8.3)
TROPONIN, HIGH SENSITIVITY: 27 NG/L (ref 0–11)
TSH SERPL DL<=0.05 MIU/L-ACNC: 1.39 UIU/ML (ref 0.27–4.2)
VITAMIN B-12: 681 PG/ML (ref 211–946)
WBC # BLD: 8.3 E9/L (ref 4.5–11.5)

## 2022-01-23 PROCEDURE — 6370000000 HC RX 637 (ALT 250 FOR IP): Performed by: INTERNAL MEDICINE

## 2022-01-23 PROCEDURE — 82550 ASSAY OF CK (CPK): CPT

## 2022-01-23 PROCEDURE — 2060000000 HC ICU INTERMEDIATE R&B

## 2022-01-23 PROCEDURE — 6360000002 HC RX W HCPCS: Performed by: INTERNAL MEDICINE

## 2022-01-23 PROCEDURE — 84100 ASSAY OF PHOSPHORUS: CPT

## 2022-01-23 PROCEDURE — 36415 COLL VENOUS BLD VENIPUNCTURE: CPT

## 2022-01-23 PROCEDURE — 85025 COMPLETE CBC W/AUTO DIFF WBC: CPT

## 2022-01-23 PROCEDURE — 2700000000 HC OXYGEN THERAPY PER DAY

## 2022-01-23 PROCEDURE — 94640 AIRWAY INHALATION TREATMENT: CPT

## 2022-01-23 PROCEDURE — 84145 PROCALCITONIN (PCT): CPT

## 2022-01-23 PROCEDURE — 80053 COMPREHEN METABOLIC PANEL: CPT

## 2022-01-23 PROCEDURE — 82607 VITAMIN B-12: CPT

## 2022-01-23 PROCEDURE — 84484 ASSAY OF TROPONIN QUANT: CPT

## 2022-01-23 PROCEDURE — 2580000003 HC RX 258: Performed by: INTERNAL MEDICINE

## 2022-01-23 PROCEDURE — 84443 ASSAY THYROID STIM HORMONE: CPT

## 2022-01-23 PROCEDURE — 83735 ASSAY OF MAGNESIUM: CPT

## 2022-01-23 PROCEDURE — 80048 BASIC METABOLIC PNL TOTAL CA: CPT

## 2022-01-23 RX ORDER — SODIUM CHLORIDE 9 MG/ML
INJECTION, SOLUTION INTRAVENOUS CONTINUOUS
Status: ACTIVE | OUTPATIENT
Start: 2022-01-23 | End: 2022-01-23

## 2022-01-23 RX ADMIN — IPRATROPIUM BROMIDE AND ALBUTEROL SULFATE 1 AMPULE: .5; 2.5 SOLUTION RESPIRATORY (INHALATION) at 13:16

## 2022-01-23 RX ADMIN — GABAPENTIN 600 MG: 300 CAPSULE ORAL at 08:59

## 2022-01-23 RX ADMIN — METHYLPREDNISOLONE SODIUM SUCCINATE 40 MG: 40 INJECTION, POWDER, FOR SOLUTION INTRAMUSCULAR; INTRAVENOUS at 02:59

## 2022-01-23 RX ADMIN — BUSPIRONE HYDROCHLORIDE 10 MG: 10 TABLET ORAL at 20:49

## 2022-01-23 RX ADMIN — BUDESONIDE 500 MCG: 0.5 SUSPENSION RESPIRATORY (INHALATION) at 08:51

## 2022-01-23 RX ADMIN — AZITHROMYCIN 500 MG: 250 TABLET, FILM COATED ORAL at 08:59

## 2022-01-23 RX ADMIN — BUSPIRONE HYDROCHLORIDE 10 MG: 10 TABLET ORAL at 15:23

## 2022-01-23 RX ADMIN — METHYLPREDNISOLONE SODIUM SUCCINATE 40 MG: 40 INJECTION, POWDER, FOR SOLUTION INTRAMUSCULAR; INTRAVENOUS at 15:23

## 2022-01-23 RX ADMIN — SODIUM CHLORIDE, PRESERVATIVE FREE 10 ML: 5 INJECTION INTRAVENOUS at 03:00

## 2022-01-23 RX ADMIN — LEVETIRACETAM 750 MG: 500 TABLET, FILM COATED ORAL at 20:48

## 2022-01-23 RX ADMIN — LAMOTRIGINE 50 MG: 25 TABLET ORAL at 06:47

## 2022-01-23 RX ADMIN — LEVETIRACETAM 750 MG: 500 TABLET, FILM COATED ORAL at 08:59

## 2022-01-23 RX ADMIN — ENOXAPARIN SODIUM 40 MG: 100 INJECTION SUBCUTANEOUS at 08:59

## 2022-01-23 RX ADMIN — IPRATROPIUM BROMIDE AND ALBUTEROL SULFATE 1 AMPULE: .5; 2.5 SOLUTION RESPIRATORY (INHALATION) at 08:51

## 2022-01-23 RX ADMIN — Medication 10 ML: at 09:01

## 2022-01-23 RX ADMIN — ASPIRIN 81 MG: 81 TABLET, COATED ORAL at 09:00

## 2022-01-23 RX ADMIN — BUSPIRONE HYDROCHLORIDE 10 MG: 10 TABLET ORAL at 09:00

## 2022-01-23 RX ADMIN — GABAPENTIN 600 MG: 300 CAPSULE ORAL at 20:49

## 2022-01-23 RX ADMIN — GABAPENTIN 600 MG: 300 CAPSULE ORAL at 15:23

## 2022-01-23 RX ADMIN — PROPRANOLOL HYDROCHLORIDE 40 MG: 20 TABLET ORAL at 09:00

## 2022-01-23 RX ADMIN — Medication 10 ML: at 20:49

## 2022-01-23 RX ADMIN — AMLODIPINE BESYLATE 5 MG: 5 TABLET ORAL at 08:59

## 2022-01-23 RX ADMIN — LAMOTRIGINE 75 MG: 25 TABLET ORAL at 17:00

## 2022-01-23 RX ADMIN — CEFTRIAXONE 1000 MG: 1 INJECTION, POWDER, FOR SOLUTION INTRAMUSCULAR; INTRAVENOUS at 02:58

## 2022-01-23 RX ADMIN — DULOXETINE HYDROCHLORIDE 60 MG: 60 CAPSULE, DELAYED RELEASE ORAL at 09:00

## 2022-01-23 RX ADMIN — PROPRANOLOL HYDROCHLORIDE 40 MG: 20 TABLET ORAL at 20:49

## 2022-01-23 RX ADMIN — ARFORMOTEROL TARTRATE 15 MCG: 15 SOLUTION RESPIRATORY (INHALATION) at 08:51

## 2022-01-23 RX ADMIN — SODIUM CHLORIDE: 9 INJECTION, SOLUTION INTRAVENOUS at 06:51

## 2022-01-23 NOTE — PROGRESS NOTES
Hospitalist Progress Note      SYNOPSIS: Patient admitted on 2022 for dyspnea. SUBJECTIVE:    Patient seen and examined. He stats that his Sob has improved. +dry cough. No fever or chills. No abd pain. No n/v. No constipation. He states that he was diarrhea which has now resolved. Records reviewed. Stable overnight. No other overnight issues reported. Temp (24hrs), Av °F (36.7 °C), Min:97.7 °F (36.5 °C), Max:98.5 °F (36.9 °C)    DIET: ADULT DIET; Regular  CODE: Full Code  No intake or output data in the 24 hours ending 22    OBJECTIVE:    /78   Pulse 75   Temp 97.7 °F (36.5 °C) (Axillary)   Resp 20   Wt 197 lb 1.5 oz (89.4 kg)   SpO2 95%   BMI 26.73 kg/m²     General appearance: No apparent distress, appears stated age and cooperative. HEENT:  Conjunctivae/corneas clear. Abrasions on the parietal aspect and forehead. Neck: Supple. No jugular venous distention. Respiratory: diminished breath sounds  Cardiovascular: Regular rate rhythm, normal S1-S2  Abdomen: Soft, nontender, nondistended  Musculoskeletal: No clubbing, cyanosis. Brisk capillary refill. Skin:  No rashes  on visible skin  Neurologic: awake, alert and following commands     ASSESSMENT:     77 y.o. male who presented to [facility] with shortness of breath. Patient presents the ED with complaints of trouble breathing. PLAN:  1.) COPD exacerbation: C/w solumedrol followed by prednisone. C/w Philipp Little.   2.) Respiratory failure with hypoxia: oxygen PRN  3.) Atypical pneumonia: Abx/nebs. 4.) Elevated troponin in context elevated CK and hypoxia  Without chest pain or acute ischemic changes on EKG-  39-> 45-> 37  5. S/p fall with resulting abrasion/contusion on head: PT/OT. CT head was negative.    6. Elevated CK/Rhabdomyolysis: f/u levels in the am/. C/w gentle hydration x 10 hrs @ 50ml/hr .   7.) DVT proph: lovenox     Labs in the am      DISPOSITION:    Medications: REVIEWED DAILY      Infusion Medications    sodium chloride       Scheduled Medications    amLODIPine  5 mg Oral Daily    aspirin  81 mg Oral Daily    busPIRone  10 mg Oral TID    DULoxetine  60 mg Oral Daily    gabapentin  600 mg Oral 4x Daily    [START ON 1/23/2022] lamoTRIgine  50 mg Oral QAM AC    levETIRAcetam  750 mg Oral BID    propranolol  40 mg Oral BID    sodium chloride flush  5-40 mL IntraVENous 2 times per day    enoxaparin  40 mg SubCUTAneous Daily    ipratropium-albuterol  1 ampule Inhalation Q4H WA    methylPREDNISolone  40 mg IntraVENous Q12H    Followed by   Raheem Malave ON 1/24/2022] predniSONE  20 mg Oral BID    cefTRIAXone (ROCEPHIN) IV  1,000 mg IntraVENous Q24H    azithromycin  500 mg Oral Daily    Arformoterol Tartrate  15 mcg Nebulization BID    budesonide  500 mcg Nebulization BID    lamoTRIgine  75 mg Oral QPM     PRN Meds: clonazePAM, sodium chloride flush, sodium chloride, ondansetron **OR** ondansetron, magnesium hydroxide, acetaminophen **OR** acetaminophen        Labs:     Recent Labs     01/21/22  1542   WBC 8.7   HGB 13.2   HCT 40.2          Recent Labs     01/21/22  1542      K 3.6      CO2 24   BUN 16   CREATININE 1.1   CALCIUM 9.1       Recent Labs     01/21/22  1542   PROT 6.7   ALKPHOS 69   ALT 44*   AST 56*   BILITOT 0.5   LIPASE 82*       No results for input(s): INR in the last 72 hours.     Recent Labs     01/21/22  1745 01/22/22  0807   CKTOTAL 1,063* 729*       Chronic labs:    Lab Results   Component Value Date    CHOL 167 07/24/2020    TRIG 294 (H) 07/24/2020    HDL 28 07/24/2020    LDLCALC 80 07/24/2020    TSH 3.360 07/24/2020    INR 1.0 07/23/2020    LABA1C 5.9 (H) 07/24/2020       Radiology: REVIEWED DAILY    +++++++++++++++++++++++++++++++++++++++++++++++++  Fab Berman MD  [facility] [place] Physician - [address] [place], [place]  +++++++++++++++++++++++++++++++++++++++++++++++++  NOTE: This report was transcribed using voice recognition software. Every effort was made to ensure accuracy; however, inadvertent computerized transcription errors may be present.

## 2022-01-23 NOTE — PROGRESS NOTES
Hospitalist Progress Note      SYNOPSIS: Patient admitted on 1/21/2022 for Chronic obstructive pulmonary disease with acute exacerbation ([facility] 75.)    77 y.o. male who presented to [facility] with shortness of breath. Patient presents the ED with complaints of trouble breathing. He is also been feeling very tired. He states he just wants to sleep. No chest pain or palpitations. No fevers or chills.  + Occasional wheezing. Denies COVID exposure. States he has been vaccinated. Notes generalized weakness, fatigue. No focal weakness numbness vision changes or speech difficulty. In ED patient's found to be hypoxic with pulse ox of 88% on room air. He had fever of 101. 1. He does note loose stool. Patient notes that he did fall couple days ago. He cannot member exactly when. He did hit his head. Denies loss of consciousness. No neck or back or extremity injury. He states he thinks he tripped. Pt is a poor historian. History is largely obtained from chart review and discussions with staff/family as available. SUBJECTIVE:    Patient seen and examined  Records reviewed. Patient resting comfortably in bed. Patient is on 4 L nasal cannula. His shortness of breath is improving    DIET: ADULT DIET; Regular  CODE: Full Code    Intake/Output Summary (Last 24 hours) at 1/23/2022 1235  Last data filed at 1/23/2022 1135  Gross per 24 hour   Intake 180 ml   Output 675 ml   Net -495 ml       OBJECTIVE:    /77   Pulse 77   Temp 97.8 °F (36.6 °C) (Oral)   Resp 18   Wt 197 lb 1.5 oz (89.4 kg)   SpO2 91%   BMI 26.73 kg/m²     General appearance: No apparent distress, appears stated age and cooperative. HEENT:  Conjunctivae/corneas clear. Normocephalic, atraumatic. Neck: Supple. No jugular venous distention. Respiratory: Diminished, bilaterally. Equal and symmetric chest excursion with inspiration. Cardiovascular: Regular rate and rhythm. No murmurs, rubs, gallop.  Normal S1-S2. Abdomen: Soft, nontender, nondistended  Musculoskeletal: No clubbing, cyanosis, no lower extremity edema, bilaterally. Brisk capillary refill. Skin:  No rashes  on visible skin  Neurologic: awake, alert and following commands     ASSESSMENT:    Principal Problem:    Chronic obstructive pulmonary disease with acute exacerbation (HCC)  Active Problems:    Hypertension    Tobacco abuse    Shortness of breath    Acute respiratory failure with hypoxia (HCC)    Rhabdomyolysis    Fall  Resolved Problems:    * No resolved hospital problems.  *       PLAN:    Continue azithromycin and Rocephin  Continue Solu-Medrol  Wean supplemental oxygen as able  Trend labs  Bronchodilators as needed  Urology recommendations    DISPOSITION: To be determined    Medications:  REVIEWED DAILY    Infusion Medications    sodium chloride 50 mL/hr at 01/23/22 7772    sodium chloride       Scheduled Medications    amLODIPine  5 mg Oral Daily    aspirin  81 mg Oral Daily    busPIRone  10 mg Oral TID    DULoxetine  60 mg Oral Daily    gabapentin  600 mg Oral 4x Daily    lamoTRIgine  50 mg Oral QAM AC    levETIRAcetam  750 mg Oral BID    propranolol  40 mg Oral BID    sodium chloride flush  5-40 mL IntraVENous 2 times per day    enoxaparin  40 mg SubCUTAneous Daily    ipratropium-albuterol  1 ampule Inhalation Q4H WA    methylPREDNISolone  40 mg IntraVENous Q12H    Followed by   Nargis Oglesby ON 1/24/2022] predniSONE  20 mg Oral BID    cefTRIAXone (ROCEPHIN) IV  1,000 mg IntraVENous Q24H    azithromycin  500 mg Oral Daily    Arformoterol Tartrate  15 mcg Nebulization BID    budesonide  500 mcg Nebulization BID    lamoTRIgine  75 mg Oral QPM     PRN Meds: clonazePAM, sodium chloride flush, sodium chloride, ondansetron **OR** ondansetron, magnesium hydroxide, acetaminophen **OR** acetaminophen    Labs:     Recent Labs     01/21/22  1542 01/23/22  0457   WBC 8.7 8.3   HGB 13.2 12.9   HCT 40.2 38.6    343       Recent Labs

## 2022-01-24 VITALS
BODY MASS INDEX: 26.73 KG/M2 | SYSTOLIC BLOOD PRESSURE: 142 MMHG | TEMPERATURE: 98.2 F | DIASTOLIC BLOOD PRESSURE: 92 MMHG | OXYGEN SATURATION: 95 % | RESPIRATION RATE: 18 BRPM | HEART RATE: 83 BPM | WEIGHT: 197.09 LBS

## 2022-01-24 PROBLEM — K57.90 DIVERTICULOSIS: Status: ACTIVE | Noted: 2022-01-24

## 2022-01-24 PROBLEM — N20.0 NEPHROLITHIASIS: Status: ACTIVE | Noted: 2022-01-24

## 2022-01-24 PROBLEM — Z99.81 O2 DEPENDENT: Status: ACTIVE | Noted: 2022-01-24

## 2022-01-24 LAB — URINE CULTURE, ROUTINE: NORMAL

## 2022-01-24 PROCEDURE — 97161 PT EVAL LOW COMPLEX 20 MIN: CPT

## 2022-01-24 PROCEDURE — 6360000002 HC RX W HCPCS: Performed by: INTERNAL MEDICINE

## 2022-01-24 PROCEDURE — 2580000003 HC RX 258: Performed by: INTERNAL MEDICINE

## 2022-01-24 PROCEDURE — 97530 THERAPEUTIC ACTIVITIES: CPT

## 2022-01-24 PROCEDURE — 6370000000 HC RX 637 (ALT 250 FOR IP): Performed by: INTERNAL MEDICINE

## 2022-01-24 PROCEDURE — 2700000000 HC OXYGEN THERAPY PER DAY

## 2022-01-24 PROCEDURE — 94640 AIRWAY INHALATION TREATMENT: CPT

## 2022-01-24 RX ORDER — PREDNISONE 20 MG/1
20 TABLET ORAL DAILY
Status: DISCONTINUED | OUTPATIENT
Start: 2022-01-30 | End: 2022-01-24 | Stop reason: HOSPADM

## 2022-01-24 RX ORDER — PREDNISONE 10 MG/1
10 TABLET ORAL DAILY
Qty: 10 TABLET | Refills: 0 | Status: SHIPPED | OUTPATIENT
Start: 2022-01-25 | End: 2022-01-24 | Stop reason: SDUPTHER

## 2022-01-24 RX ORDER — LACTOBACILLUS RHAMNOSUS GG 10B CELL
1 CAPSULE ORAL 2 TIMES DAILY
Status: DISCONTINUED | OUTPATIENT
Start: 2022-01-24 | End: 2022-01-24 | Stop reason: HOSPADM

## 2022-01-24 RX ORDER — PREDNISONE 20 MG/1
40 TABLET ORAL DAILY
Status: DISCONTINUED | OUTPATIENT
Start: 2022-01-24 | End: 2022-01-24 | Stop reason: HOSPADM

## 2022-01-24 RX ORDER — PREDNISONE 20 MG/1
30 TABLET ORAL DAILY
Status: DISCONTINUED | OUTPATIENT
Start: 2022-01-27 | End: 2022-01-24 | Stop reason: HOSPADM

## 2022-01-24 RX ORDER — CLONIDINE HYDROCHLORIDE 0.1 MG/1
0.1 TABLET ORAL EVERY 4 HOURS PRN
Status: DISCONTINUED | OUTPATIENT
Start: 2022-01-24 | End: 2022-01-24 | Stop reason: HOSPADM

## 2022-01-24 RX ORDER — LACTOBACILLUS RHAMNOSUS GG 10B CELL
1 CAPSULE ORAL 2 TIMES DAILY
Qty: 28 CAPSULE | Refills: 0 | Status: SHIPPED | OUTPATIENT
Start: 2022-01-24 | End: 2022-09-28

## 2022-01-24 RX ORDER — LACTOBACILLUS RHAMNOSUS GG 10B CELL
1 CAPSULE ORAL 2 TIMES DAILY
Qty: 28 CAPSULE | Refills: 0 | Status: SHIPPED | OUTPATIENT
Start: 2022-01-24 | End: 2022-01-24

## 2022-01-24 RX ORDER — MECOBALAMIN 5000 MCG
5 TABLET,DISINTEGRATING ORAL NIGHTLY PRN
Status: DISCONTINUED | OUTPATIENT
Start: 2022-01-24 | End: 2022-01-24 | Stop reason: HOSPADM

## 2022-01-24 RX ORDER — MECOBALAMIN 5000 MCG
5 TABLET,DISINTEGRATING ORAL NIGHTLY
Status: DISCONTINUED | OUTPATIENT
Start: 2022-01-24 | End: 2022-01-24 | Stop reason: HOSPADM

## 2022-01-24 RX ORDER — CEFDINIR 300 MG/1
300 CAPSULE ORAL 2 TIMES DAILY
Qty: 10 CAPSULE | Refills: 0 | Status: SHIPPED | OUTPATIENT
Start: 2022-01-24 | End: 2022-01-24 | Stop reason: SDUPTHER

## 2022-01-24 RX ORDER — ACETAMINOPHEN 500 MG
1000 TABLET ORAL EVERY 6 HOURS PRN
Status: DISCONTINUED | OUTPATIENT
Start: 2022-01-24 | End: 2022-01-24 | Stop reason: HOSPADM

## 2022-01-24 RX ORDER — CEFDINIR 300 MG/1
300 CAPSULE ORAL 2 TIMES DAILY
Qty: 10 CAPSULE | Refills: 0 | Status: SHIPPED | OUTPATIENT
Start: 2022-01-24 | End: 2022-01-29

## 2022-01-24 RX ORDER — PREDNISONE 10 MG/1
10 TABLET ORAL DAILY
Qty: 10 TABLET | Refills: 0 | Status: SHIPPED | OUTPATIENT
Start: 2022-01-25 | End: 2022-02-04

## 2022-01-24 RX ADMIN — PREDNISONE 40 MG: 20 TABLET ORAL at 10:38

## 2022-01-24 RX ADMIN — GABAPENTIN 600 MG: 300 CAPSULE ORAL at 10:37

## 2022-01-24 RX ADMIN — IPRATROPIUM BROMIDE AND ALBUTEROL SULFATE 1 AMPULE: .5; 2.5 SOLUTION RESPIRATORY (INHALATION) at 13:13

## 2022-01-24 RX ADMIN — BUSPIRONE HYDROCHLORIDE 10 MG: 10 TABLET ORAL at 10:37

## 2022-01-24 RX ADMIN — Medication 10 ML: at 10:38

## 2022-01-24 RX ADMIN — ENOXAPARIN SODIUM 40 MG: 100 INJECTION SUBCUTANEOUS at 10:38

## 2022-01-24 RX ADMIN — DULOXETINE HYDROCHLORIDE 60 MG: 60 CAPSULE, DELAYED RELEASE ORAL at 10:37

## 2022-01-24 RX ADMIN — LEVETIRACETAM 750 MG: 500 TABLET, FILM COATED ORAL at 10:38

## 2022-01-24 RX ADMIN — PROPRANOLOL HYDROCHLORIDE 40 MG: 20 TABLET ORAL at 10:37

## 2022-01-24 RX ADMIN — ASPIRIN 81 MG: 81 TABLET, COATED ORAL at 10:37

## 2022-01-24 RX ADMIN — GABAPENTIN 600 MG: 300 CAPSULE ORAL at 16:32

## 2022-01-24 RX ADMIN — BUDESONIDE 500 MCG: 0.5 SUSPENSION RESPIRATORY (INHALATION) at 08:59

## 2022-01-24 RX ADMIN — AMLODIPINE BESYLATE 5 MG: 5 TABLET ORAL at 10:38

## 2022-01-24 RX ADMIN — CEFTRIAXONE 1000 MG: 1 INJECTION, POWDER, FOR SOLUTION INTRAMUSCULAR; INTRAVENOUS at 02:36

## 2022-01-24 RX ADMIN — BUSPIRONE HYDROCHLORIDE 10 MG: 10 TABLET ORAL at 16:32

## 2022-01-24 RX ADMIN — Medication 1 CAPSULE: at 10:38

## 2022-01-24 RX ADMIN — SODIUM CHLORIDE, PRESERVATIVE FREE 10 ML: 5 INJECTION INTRAVENOUS at 02:36

## 2022-01-24 RX ADMIN — ARFORMOTEROL TARTRATE 15 MCG: 15 SOLUTION RESPIRATORY (INHALATION) at 08:59

## 2022-01-24 RX ADMIN — IPRATROPIUM BROMIDE AND ALBUTEROL SULFATE 1 AMPULE: .5; 2.5 SOLUTION RESPIRATORY (INHALATION) at 08:59

## 2022-01-24 RX ADMIN — LAMOTRIGINE 50 MG: 25 TABLET ORAL at 06:11

## 2022-01-24 RX ADMIN — PREDNISONE 20 MG: 20 TABLET ORAL at 02:36

## 2022-01-24 RX ADMIN — AZITHROMYCIN 500 MG: 250 TABLET, FILM COATED ORAL at 10:37

## 2022-01-24 ASSESSMENT — PAIN SCALES - GENERAL: PAINLEVEL_OUTOF10: 0

## 2022-01-24 NOTE — CARE COORDINATION
Spoke with patient. He is from home, lives alone. He uses a walker. His PCP is AMOS Millan at the [place]. [facility] does not have home oxygen[facility] for [facility]. Patient denied needs at discharge. Will follow up after walking pulse ox to verify he does not home oxygen. Patient will need home oxygen, [facility]. They will deliver oxygen to the room. He does need to follow up with civilian physician in order for them to dispense oxygen. Discussed with patient and he is willing to follow up with new PCP. Arranged with [facility] and placed on AVS.     For questions I can be reached at [phone].  Minh Cazares [place]

## 2022-01-24 NOTE — PROGRESS NOTES
RN-IF HOME O2 IS NEEDED PLEASE DOCUMENT THE PULSE OX IN YOUR PG NOTES THE FOLLOWING WAY: (CUT AND PASTE AND FILL IN THE BLANKS)     PULSE OX ON ROOM AIR SITTING __89__%   PULSE OX ON ROOM AIR AMBULATING _88_%   PULSE OX ON _2__ LITERS SITTING RECOVERY _95__%   PULSE OX ON __2_ LITERS AMBULATING [id number]___%   Last edited by Evert Reddy RN on 01/24/22 at 8647

## 2022-01-24 NOTE — DISCHARGE SUMMARY
Patient: Javed Cespedes Sex: male DOA: 1/21/2022   YOB: 1955 Age: 77 y.o. LOS:  LOS: 3 days    Admit Date: 1/21/2022   Discharge Date: 01/24/22   Primary Care Physician: SAI TRUJILLO CNP   Discharge to: home with support  Readmission risk: Moderate Risk    Hospital admission:  Per HPI:\" 77 y.o. male who presented to [facility] with shortness of breath. Patient presents the ED with complaints of trouble breathing. He is also been feeling very tired. He states he just wants to sleep. No chest pain or palpitations. No fevers or chills.  + Occasional wheezing. Denies COVID exposure. States he has been vaccinated. Notes generalized weakness, fatigue. No focal weakness numbness vision changes or speech difficulty. In ED patient's found to be hypoxic with pulse ox of 88% on room air. He had fever of 101. 1. He does note loose stool. Patient notes that he did fall couple days ago. He cannot member exactly when. He did hit his head. Denies loss of consciousness. No neck or back or extremity injury. He states he thinks he tripped. Pt is a poor historian. History is largely obtained from chart review and discussions with staff/family as available.   VANTAGE POINT OF [facility] Course and discharge assessment with plan:     Acute hypoxic respiratory failure 2/2 AECOPD with B/L PNA, improved and at baseline  Switched to PO prednisone and will taper  Discharge on short coarse of cefdinir  Add probiotic  Counseled on smoking cessation (said he quit the day he came to the hospital)  Discharge on 2L O2     Nephrolithiasis  Evaluated by Urology and do not think cause of fever  Urine cx neg     Seizure d/o  Cont home meds    Discharge plan of care was discussed with: pt, RN, CM    Discharge Diagnoses:   Patient Active Problem List   Diagnosis    Seizure disorder ([facility]    Hypertension    Cervical radiculopathy    Lumbar radiculopathy    Cervical disc displacement    Disc displacement, lumbar  Chest pain    Marijuana use    Tobacco abuse    Bilateral low back pain without sciatica    Generalized seizure disorder ([facility]    Hx-TIA (transient ischemic attack)    Chronic obstructive pulmonary disease with acute exacerbation (HCC)    Facial cellulitis    Shortness of breath    Acute respiratory failure with hypoxia (HCC)    Rhabdomyolysis    Fall    Diverticulosis    Nephrolithiasis    O2 dependent       Discharge Medications:   Current Discharge Medication List           Details   lactobacillus (CULTURELLE) CAPS capsule Take 1 capsule by mouth 2 times daily for 14 days  Qty: 28 capsule, Refills: 0      cefdinir (OMNICEF) 300 MG capsule Take 1 capsule by mouth 2 times daily for 5 days  Qty: 10 capsule, Refills: 0      predniSONE (DELTASONE) 10 MG tablet Take 1 tablet by mouth daily for 10 days Prednisone 40 mg for 3 days, then 30 mg for 3 days, then 20 mg for 3 days, then 10 mg for 3 days then stop  Qty: 10 tablet, Refills: 0              Details   DULoxetine (CYMBALTA) 60 MG extended release capsule Take 1 capsule by mouth daily  Qty: 30 capsule, Refills: 0      acetaminophen (TYLENOL) 325 MG tablet Take 2 tablets by mouth every 6 hours as needed for Pain  Qty: 120 tablet, Refills: 0      tobramycin-dexamethasone (TOBRADEX) 0.3-0.1 % ophthalmic ointment Place into both eyes nightly      prednisoLONE acetate (PRED FORTE) 1 % ophthalmic suspension Place 1 drop into both eyes 3 times daily      clonazePAM (KLONOPIN) 1 MG tablet Take 1 mg by mouth 2 times daily as needed.       ketorolac (TORADOL) 10 MG tablet Take 10 mg by mouth every 6 hours as needed for Pain      propranolol (INDERAL) 40 MG tablet Take 40 mg by mouth 2 times daily      amLODIPine (NORVASC) 5 MG tablet Take 5 mg by mouth daily      busPIRone (BUSPAR) 10 MG tablet Take 10 mg by mouth 3 times daily      lamoTRIgine (LAMICTAL) 25 MG tablet Take 25 mg by mouth See Admin Instructions 50 mg - AM  75 mg - PM      aspirin 81 MG EC tablet Take 81 mg by mouth daily      albuterol (PROVENTIL) (5 MG/ML) 0.5% nebulizer solution Take 0.5 mLs by nebulization every 6 hours as needed for Wheezing  Qty: 120 each, Refills: 0      fluticasone-vilanterol (BREO ELLIPTA) 100-25 MCG/INH AEPB inhaler Inhale 1 puff into the lungs daily  Qty: 30 each, Refills: 5      albuterol sulfate HFA (PROAIR HFA) 108 (90 Base) MCG/ACT inhaler Inhale 2 puffs into the lungs every 6 hours as needed for Wheezing  Qty: 1 Inhaler, Refills: 5      vardenafil (LEVITRA) 20 MG tablet Take 20 mg by mouth as needed for Erectile Dysfunction      levETIRAcetam (KEPPRA) 750 MG tablet Take 1 tablet by mouth 2 times daily  Qty: 60 tablet, Refills: 11      gabapentin (NEURONTIN) 800 MG tablet Take 800 mg by mouth 4 times daily. Follow-up: PCP in 3-5 days     Discharge Condition: stable    Discharge instruction:   Patient instructed to return to the emergency department if: Chest pain, shortness of breath, altered mental status, fever, chills, nausea, vomiting, abdominal pain or any other concerns. Activity: progressive as tolerated. Diet: regular diet     Wound Care: none needed     Consults: none       Discharge Physical Exam:   BP (!) 142/92   Pulse 83   Temp 98.2 °F (36.8 °C) (Oral)   Resp 18   Wt 197 lb 1.5 oz (89.4 kg)   SpO2 95%   BMI 26.73 kg/m²     General appearance: Alert, cooperative, no distress, appears stated age   Head: Normocephalic, without obvious abnormality, atraumatic   Neck: Supple, no lymphadenopathy or thyromegaly, trachea midline   Lungs: Clear to auscultation bilaterally, no wheezing or crackles   Heart: Regular rate and rhythm, S1, S2 normal   Abdomen: Soft, non-tender and not-distended.  Bowel sounds normal.   Extremities: Extremities normal, atraumatic, no cyanosis or edema   Skin: Skin color, texture, turgor normal. No rashes or lesions   Neurologic: AAOx3 CN I-XII intact, normal muscle tone and power, normal sensation       Jamia Reach Hasmukh Orellana MD   01/24/22 3:39 PM    Hospital Medicine   Time Spent: 50 min

## 2022-01-24 NOTE — PROGRESS NOTES
Patient walked to restroom, output of 200 ml straw yellow urine. Strained all urine, no sediment noted. patient ambulated back to bed without difficulty.

## 2022-01-24 NOTE — PROGRESS NOTES
Physical Therapy  Physical Therapy Initial Assessment     Name: Samantha Aldrich  : [id number]  MRN: [id number]      Date of Service: 2022    Evaluating PT:  Kyung Roca PT, DPT [id number]     Room #:  7053/4949-N  Diagnosis:  Shortness of breath [R06.02]  Non-traumatic rhabdomyolysis [M62.82]  Reason for admission: SOB, recent fall   Precautions:  Falls, O2  Procedure/Surgery:  None   Equipment Recommendations:  FWW    SUBJECTIVE:  Pt lives alone in a 1 story home with 3 SAVANNAH 1 rail. Pt ambulated with FWW PTA. OBJECTIVE:   Initial Evaluation  Date:  Treatment   Short Term/ Long Term   Goals   AM-PAC 6 Clicks      Was pt agreeable to Eval/treatment? Yes      Does pt have pain?  Denies      Bed Mobility  Rolling: NT  Supine to sit: SBA  Sit to supine: SBA  Scooting: SBA  Independent    Transfers Sit to stand: [place]  Stand to sit: SBA  Stand pivot: NT  Independent    Ambulation    60 feet with [employer] SBA    >150 feet with [employer] Mod I    Stair negotiation: ascended and descended  NT  3 steps with 1 rail Mod I      LE ROM: WFL    LE Strength:   knee ext: 5/5  ankle DF: 5/5    Balance:   Sitting static: Independent  Sitting dynamic: Supervision  Standing static: SBA  Standing dynamic: SBA      -Pt is A & O x 3  -Sensation:  Pt denies numbness and tingling to extremities  -Edema:  unremarkable     Therapeutic Exercises:  Functional activity     Patient education  Pt educated on safety, sequencing of transfers, and role of PT    Patient response to education:   Pt verbalized understanding Pt demonstrated skill Pt requires further education in this area   Yes  Yes  Yes      ASSESSMENT:  Conditions Requiring Skilled Therapeutic Intervention:  []Decreased strength     []Decreased ROM  [x]Decreased functional mobility  [x]Decreased balance   [x]Decreased endurance   []Decreased posture  []Decreased sensation  []Decreased coordination   []Decreased vision  []Decreased safety awareness   []Increased pain Comments:  Pt received supine in bed and agreeable to PT session   Pt presents with SOB with all mobility. Monitored o2 sats throughout session which remained 92-94% on 4 L. He had some difficulty standing from EOB with posterior leaning needing light assist to correct. His ambulation was slow but steady with use of walker. Cues needed to maintain body in center of walker. Provided time to void in bathroom prior to returning to bed. Pt with all needs met and call light in reach. Pt would benefit from continued PT POC to address functional deficits described above. Treatment:  Patient practiced and was instructed in the following treatment:     Therapeutic activity  o Patient education provided continuously throughout session for sequencing, safety maintenance, and improving any deficits found during the evaluation. o Bed mobility training - pt given verbal and tactile cues to facilitate proper sequencing and safety during rolling and supine>sit    o STS and pivot transfer training - pt educated on proper hand and foot placement, safety and sequencing, and use of proper technique to safely complete sit<>stand and pivot transfers with hands on assistance to complete task safely   o Gait training- pt was given verbal and tactile cues to facilitate improved ambulation speed and proper use of walker during ambulation     Pt's/ family goals   1. Return home     Patient and or family understand(s) diagnosis, prognosis, and plan of care. Yes     Prognosis is good for reaching above PT goals.     PHYSICAL THERAPY PLAN OF CARE:    PT POC is established based on physician order and patient diagnosis     Referring provider/PT Order:  01/22/22 [id number]   PT evaluation and treat Start: 01/22/22 0245, End: 01/22/22 0245, ONE TIME, Standing Count: 1 Occurrences, R    Franky Higuera MD    Diagnosis:  Shortness of breath [R06.02]  Non-traumatic rhabdomyolysis [M62.82]  Specific instructions for next treatment: Progress ambulation distance as able. Current Treatment Recommendations:   [] Strengthening to improve independence with functional mobility   [] ROM to improve independence with functional mobility   [x] Balance Training to improve static/dynamic balance and to reduce fall risk  [x] Endurance Training to improve activity tolerance during functional mobility   [x] Transfer Training to improve safety and independence with all functional transfers   [x] Gait Training to improve gait mechanics, endurance and asses need for appropriate assistive device  [] Stair Training in preparation for safe discharge home and/or into the community   [x] Positioning to prevent skin breakdown and contractures  [x] Safety and Education Training   [] Patient/Caregiver Education   [] HEP  [] Other     PT long term treatment goals are located in above grid    Frequency of treatments: 2-5x/week x 1-2 weeks. Time in  0810  Time out  0830    Total Treatment Time  10 minutes     Evaluation Time includes thorough review of current medical information, gathering information on past medical history/social history and prior level of function, completion of standardized testing/informal observation of tasks, assessment of data and education on plan of care and goals.     CPT codes:  [x] Low Complexity PT evaluation 51372  [] Moderate Complexity PT evaluation 58549  [] High Complexity PT evaluation 46050  [] PT Re-evaluation 32655  [] Gait training 44847 - minutes  [] Manual therapy 83834 - minutes  [x] Therapeutic activities 86287 10 minutes  [] Therapeutic exercises 36308 - minutes  [] Neuromuscular reeducation 76887 - minutes     Alvarado Crisostomo PT, DPT  [id number]

## 2022-01-26 LAB
BLOOD CULTURE, ROUTINE: NORMAL
CULTURE, BLOOD 2: NORMAL
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
## 2022-09-28 ENCOUNTER — APPOINTMENT (OUTPATIENT)
Dept: CT IMAGING | Age: 67
End: 2022-09-28
Payer: OTHER GOVERNMENT

## 2022-09-28 ENCOUNTER — APPOINTMENT (OUTPATIENT)
Dept: GENERAL RADIOLOGY | Age: 67
End: 2022-09-28
Payer: OTHER GOVERNMENT

## 2022-09-28 ENCOUNTER — HOSPITAL ENCOUNTER (OUTPATIENT)
Age: 67
Setting detail: OBSERVATION
Discharge: HOME | End: 2022-09-29
Attending: EMERGENCY MEDICINE | Admitting: FAMILY MEDICINE
Payer: OTHER GOVERNMENT

## 2022-09-28 DIAGNOSIS — E86.9 VOLUME DEPLETION: Primary | ICD-10-CM

## 2022-09-28 DIAGNOSIS — I95.1 ORTHOSTATIC HYPOTENSION: ICD-10-CM

## 2022-09-28 DIAGNOSIS — N17.9 AKI (ACUTE KIDNEY INJURY) (HCC): ICD-10-CM

## 2022-09-28 DIAGNOSIS — R29.6 MULTIPLE FALLS: ICD-10-CM

## 2022-09-28 PROBLEM — E86.0 DEHYDRATION: Status: ACTIVE | Noted: 2022-09-28

## 2022-09-28 LAB
ALBUMIN SERPL-MCNC: 4.1 G/DL (ref 3.5–5.2)
ALP BLD-CCNC: 97 U/L (ref 40–129)
ALT SERPL-CCNC: 12 U/L (ref 0–40)
ANION GAP SERPL CALCULATED.3IONS-SCNC: 14 MMOL/L (ref 7–16)
AST SERPL-CCNC: 19 U/L (ref 0–39)
BASOPHILS ABSOLUTE: 0.07 E9/L (ref 0–0.2)
BASOPHILS RELATIVE PERCENT: 0.8 % (ref 0–2)
BILIRUB SERPL-MCNC: <0.2 MG/DL (ref 0–1.2)
BUN BLDV-MCNC: 24 MG/DL (ref 6–23)
CALCIUM SERPL-MCNC: 9.6 MG/DL (ref 8.6–10.2)
CHLORIDE BLD-SCNC: 105 MMOL/L (ref 98–107)
CO2: 22 MMOL/L (ref 22–29)
CREAT SERPL-MCNC: 1.4 MG/DL (ref 0.7–1.2)
EOSINOPHILS ABSOLUTE: 0.4 E9/L (ref 0.05–0.5)
EOSINOPHILS RELATIVE PERCENT: 4.5 % (ref 0–6)
GFR AFRICAN AMERICAN: >60
GFR NON-AFRICAN AMERICAN: 51 ML/MIN/1.73
GLUCOSE BLD-MCNC: 104 MG/DL (ref 74–99)
HCT VFR BLD CALC: 42.9 % (ref 37–54)
HEMOGLOBIN: 14.3 G/DL (ref 12.5–16.5)
IMMATURE GRANULOCYTES #: 0.02 E9/L
IMMATURE GRANULOCYTES %: 0.2 % (ref 0–5)
LYMPHOCYTES ABSOLUTE: 1.28 E9/L (ref 1.5–4)
LYMPHOCYTES RELATIVE PERCENT: 14.5 % (ref 20–42)
MCH RBC QN AUTO: 31.2 PG (ref 26–35)
MCHC RBC AUTO-ENTMCNC: 33.3 % (ref 32–34.5)
MCV RBC AUTO: 93.7 FL (ref 80–99.9)
MONOCYTES ABSOLUTE: 0.86 E9/L (ref 0.1–0.95)
MONOCYTES RELATIVE PERCENT: 9.7 % (ref 2–12)
NEUTROPHILS ABSOLUTE: 6.21 E9/L (ref 1.8–7.3)
NEUTROPHILS RELATIVE PERCENT: 70.3 % (ref 43–80)
PDW BLD-RTO: 13.4 FL (ref 11.5–15)
PLATELET # BLD: 239 E9/L (ref 130–450)
PMV BLD AUTO: 9.8 FL (ref 7–12)
POTASSIUM REFLEX MAGNESIUM: 4.9 MMOL/L (ref 3.5–5)
RBC # BLD: 4.58 E12/L (ref 3.8–5.8)
SODIUM BLD-SCNC: 141 MMOL/L (ref 132–146)
TOTAL PROTEIN: 6.2 G/DL (ref 6.4–8.3)
TROPONIN, HIGH SENSITIVITY: 15 NG/L (ref 0–11)
TROPONIN, HIGH SENSITIVITY: 17 NG/L (ref 0–11)
WBC # BLD: 8.8 E9/L (ref 4.5–11.5)

## 2022-09-28 PROCEDURE — 93005 ELECTROCARDIOGRAM TRACING: CPT

## 2022-09-28 PROCEDURE — 96360 HYDRATION IV INFUSION INIT: CPT

## 2022-09-28 PROCEDURE — 85025 COMPLETE CBC W/AUTO DIFF WBC: CPT

## 2022-09-28 PROCEDURE — 99285 EMERGENCY DEPT VISIT HI MDM: CPT

## 2022-09-28 PROCEDURE — 80177 DRUG SCRN QUAN LEVETIRACETAM: CPT

## 2022-09-28 PROCEDURE — 70450 CT HEAD/BRAIN W/O DYE: CPT

## 2022-09-28 PROCEDURE — 71045 X-RAY EXAM CHEST 1 VIEW: CPT

## 2022-09-28 PROCEDURE — 84484 ASSAY OF TROPONIN QUANT: CPT

## 2022-09-28 PROCEDURE — 2580000003 HC RX 258

## 2022-09-28 PROCEDURE — 80053 COMPREHEN METABOLIC PANEL: CPT

## 2022-09-28 PROCEDURE — 96361 HYDRATE IV INFUSION ADD-ON: CPT

## 2022-09-28 PROCEDURE — G0378 HOSPITAL OBSERVATION PER HR: HCPCS

## 2022-09-28 RX ORDER — LISINOPRIL 20 MG/1
20 TABLET ORAL DAILY
Status: ON HOLD | COMMUNITY
End: 2022-09-29 | Stop reason: HOSPADM

## 2022-09-28 RX ORDER — DULOXETIN HYDROCHLORIDE 60 MG/1
60 CAPSULE, DELAYED RELEASE ORAL 2 TIMES DAILY
COMMUNITY

## 2022-09-28 RX ORDER — 0.9 % SODIUM CHLORIDE 0.9 %
1000 INTRAVENOUS SOLUTION INTRAVENOUS ONCE
Status: COMPLETED | OUTPATIENT
Start: 2022-09-28 | End: 2022-09-29

## 2022-09-28 RX ORDER — ATORVASTATIN CALCIUM 20 MG/1
20 TABLET, FILM COATED ORAL NIGHTLY
COMMUNITY

## 2022-09-28 RX ORDER — 0.9 % SODIUM CHLORIDE 0.9 %
1000 INTRAVENOUS SOLUTION INTRAVENOUS ONCE
Status: COMPLETED | OUTPATIENT
Start: 2022-09-28 | End: 2022-09-28

## 2022-09-28 RX ADMIN — SODIUM CHLORIDE 1000 ML: 9 INJECTION, SOLUTION INTRAVENOUS at 18:30

## 2022-09-28 RX ADMIN — SODIUM CHLORIDE 1000 ML: 9 INJECTION, SOLUTION INTRAVENOUS at 15:13

## 2022-09-28 ASSESSMENT — ENCOUNTER SYMPTOMS
VOMITING: 0
WHEEZING: 0
TROUBLE SWALLOWING: 0
ABDOMINAL DISTENTION: 0
EYE REDNESS: 0
EYE ITCHING: 0
BACK PAIN: 0
NAUSEA: 0
CHEST TIGHTNESS: 0
SHORTNESS OF BREATH: 0
CONSTIPATION: 0
RHINORRHEA: 0
DIARRHEA: 0
ABDOMINAL PAIN: 0

## 2022-09-28 ASSESSMENT — PAIN SCALES - GENERAL: PAINLEVEL_OUTOF10: 8

## 2022-09-28 ASSESSMENT — PAIN - FUNCTIONAL ASSESSMENT: PAIN_FUNCTIONAL_ASSESSMENT: NONE - DENIES PAIN

## 2022-09-28 ASSESSMENT — PAIN DESCRIPTION - LOCATION: LOCATION: BACK

## 2022-09-28 NOTE — ED PROVIDER NOTES
Keri Lincoln is a 79 y.o. male    Chief Complaint   Patient presents with    Fall     VA hypotension and fell, weak, \"dizzy for weeks\" no pain, states been falling alot          HPI   Keri Lincoln is a 79 y.o. male presenting to the ED for Fall ([address] hypotension and fell, weak, \"dizzy for weeks\" no pain, states been falling alot )    History comes primarily from the patient. Patient presents to the emergency room for a fall due to lightheadedness/dizziness while he was at a routine psychiatric visit at the [address] [facility] today. He was also noted to be hypotensive at the time. Patient says he has been lightheaded for weeks but without any other symptoms really except for frequent falls. He denies any chest pain, shortness of breath, headache, changes in vision, numbness or tingling, abdominal pain, vomiting or diarrhea, urinary or other bowel symptoms. His symptoms have been intermittent, moderate to severe, not appearing to be relieved by anything. Patient denies any changes in oral intake, increase in urine output, fevers, chest pain, shortness of breath, or other illness symptoms. Review of Systems   Constitutional:  Negative for appetite change, fatigue and fever. HENT:  Negative for congestion, rhinorrhea and trouble swallowing. Eyes:  Negative for redness and itching. Respiratory:  Negative for chest tightness, shortness of breath and wheezing. Cardiovascular:  Negative for chest pain, palpitations and leg swelling. Gastrointestinal:  Negative for abdominal distention, abdominal pain, constipation, diarrhea, nausea and vomiting. Genitourinary:  Negative for decreased urine volume, difficulty urinating and frequency. Musculoskeletal:  Negative for arthralgias, back pain and myalgias. Neurological:  Positive for light-headedness (plus falls). Negative for dizziness, syncope, weakness, numbness and headaches.    Psychiatric/Behavioral:  Negative for agitation, behavioral problems, confusion and decreased concentration. The patient is not nervous/anxious. All other systems reviewed and are negative. Physical Exam  Vitals reviewed. Constitutional:       General: He is not in acute distress. Appearance: Normal appearance. He is normal weight. He is not ill-appearing. HENT:      Head: Normocephalic and atraumatic. Right Ear: External ear normal.      Left Ear: External ear normal.      Nose: Nose normal. No rhinorrhea. Mouth/Throat:      Mouth: Mucous membranes are moist.      Pharynx: Oropharynx is clear. Eyes:      Extraocular Movements: Extraocular movements intact. Conjunctiva/sclera: Conjunctivae normal.      Pupils: Pupils are equal, round, and reactive to light. Cardiovascular:      Rate and Rhythm: Normal rate and regular rhythm. Heart sounds: Normal heart sounds. No murmur heard. Pulmonary:      Effort: Pulmonary effort is normal. No respiratory distress. Breath sounds: Normal breath sounds. Abdominal:      General: Abdomen is flat. There is no distension. Tenderness: There is no abdominal tenderness. Musculoskeletal:         General: No swelling or tenderness. Normal range of motion. Cervical back: Normal range of motion. No rigidity or tenderness. Skin:     General: Skin is warm and dry. Findings: No rash. Neurological:      General: No focal deficit present. Mental Status: He is alert and oriented to person, place, and time. Cranial Nerves: No cranial nerve deficit. Motor: No weakness. Psychiatric:         Mood and Affect: Mood normal.         Behavior: Behavior normal.        Procedures     MDM  Patient presented to the Emergency Department for Fall (VA hypotension and fell, weak, \"dizzy for weeks\" no pain, states been falling alot )    Patient appears to be volume depleted. His orthostatic vitals were positive, has an MERON. Patient treated with fluids in the ED with some improvement his symptoms. Ultimately, we decided to admit the patient due to the significance of the MERON and his associated volume depletion, for further treatment and management. EKG: This EKG is signed and interpreted by me. Rate: 58  Rhythm: sinus bradycardia  Axis: normal  Interpretation: no acute changes  Comparison: stable as compared to patient's most recent EKG    ED Course as of 09/29/22 1330   Wed Sep 28, 2022   2037 Discussed admission with sound physician Dr. Summer giraldo to admit to telemetry. [JN]      ED Course User Index  [JN] Neto Figueroa, DO       --------------------------------------------- PAST HISTORY ---------------------------------------------  Past Medical History:  has a past medical history of Back pain, chronic, Brain tumor ([facility] [address] Chest pain, Herniated disc, Marijuana use, Seizure disorder ([facility] [address] Seizures ([facility] [address] Tobacco abuse, Tobacco abuse, and Unspecified cerebral artery occlusion with cerebral infarction. Past Surgical History:  has a past surgical history that includes Eye surgery (Bilateral); Tonsillectomy; External ear surgery; cervical fusion; and brain surgery. Social History:  reports that he has been smoking cigarettes. He has a 20.00 pack-year smoking history. He has never used smokeless tobacco. He reports that he does not drink alcohol and does not use drugs. Family History: family history includes Seizures in his son; Substance Abuse in his father. The patients home medications have been reviewed.     Allergies: Tramadol    -------------------------------------------------- RESULTS -------------------------------------------------    LABS:  Results for orders placed or performed during the hospital encounter of 09/28/22   CBC with Auto Differential   Result Value Ref Range    WBC 8.8 4.5 - 11.5 E9/L    RBC 4.58 3.80 - 5.80 E12/L    Hemoglobin 14.3 12.5 - 16.5 g/dL    Hematocrit 42.9 37.0 - 54.0 %    MCV 93.7 80.0 - 99.9 fL    MCH 31.2 26.0 - 35.0 pg    MCHC 33.3 32.0 - 34.5 % RDW 13.4 11.5 - 15.0 fL    Platelets 454 298 - 894 E9/L    MPV 9.8 7.0 - 12.0 fL    Neutrophils % 70.3 43.0 - 80.0 %    Immature Granulocytes % 0.2 0.0 - 5.0 %    Lymphocytes % 14.5 (L) 20.0 - 42.0 %    Monocytes % 9.7 2.0 - 12.0 %    Eosinophils % 4.5 0.0 - 6.0 %    Basophils % 0.8 0.0 - 2.0 %    Neutrophils Absolute 6.21 1.80 - 7.30 E9/L    Immature Granulocytes # 0.02 E9/L    Lymphocytes Absolute 1.28 (L) 1.50 - 4.00 E9/L    Monocytes Absolute 0.86 0.10 - 0.95 E9/L    Eosinophils Absolute 0.40 0.05 - 0.50 E9/L    Basophils Absolute 0.07 0.00 - 0.20 E9/L   Comprehensive Metabolic Panel w/ Reflex to MG   Result Value Ref Range    Sodium 141 132 - 146 mmol/L    Potassium reflex Magnesium 4.9 3.5 - 5.0 mmol/L    Chloride 105 98 - 107 mmol/L    CO2 22 22 - 29 mmol/L    Anion Gap 14 7 - 16 mmol/L    Glucose 104 (H) 74 - 99 mg/dL    BUN 24 (H) 6 - 23 mg/dL    Creatinine 1.4 (H) 0.7 - 1.2 mg/dL    GFR Non-African American 51 >=60 mL/min/1.73    GFR African American >60     Calcium 9.6 8.6 - 10.2 mg/dL    Total Protein 6.2 (L) 6.4 - 8.3 g/dL    Albumin 4.1 3.5 - 5.2 g/dL    Total Bilirubin <0.2 0.0 - 1.2 mg/dL    Alkaline Phosphatase 97 40 - 129 U/L    ALT 12 0 - 40 U/L    AST 19 0 - 39 U/L   Troponin   Result Value Ref Range    Troponin, High Sensitivity 17 (H) 0 - 11 ng/L   Troponin   Result Value Ref Range    Troponin, High Sensitivity 15 (H) 0 - 11 ng/L   Comprehensive Metabolic Panel w/ Reflex to MG   Result Value Ref Range    Sodium 147 (H) 132 - 146 mmol/L    Potassium reflex Magnesium 6.2 (H) 3.5 - 5.0 mmol/L    Chloride 114 (H) 98 - 107 mmol/L    CO2 28 22 - 29 mmol/L    Anion Gap 5 (L) 7 - 16 mmol/L    Glucose 89 74 - 99 mg/dL    BUN 19 6 - 23 mg/dL    Creatinine 1.2 0.7 - 1.2 mg/dL    GFR Non-African American >60 >=60 mL/min/1.73    GFR African American >60     Calcium 9.3 8.6 - 10.2 mg/dL    Total Protein 5.4 (L) 6.4 - 8.3 g/dL    Albumin 3.6 3.5 - 5.2 g/dL    Total Bilirubin <0.2 0.0 - 1.2 mg/dL 97.7 °F (36.5 °C) -- 60 16 97 % --   09/29/22 0009 -- -- -- -- -- -- 6' (1.829 m)   09/28/22 2215 (!) 97/57 -- -- 64 16 98 % --   09/28/22 2030 (!) 99/51 -- -- 58 16 97 % --   09/28/22 1810 114/75 97.4 °F (36.3 °C) Oral 59 16 98 % --       Oxygen Saturation Interpretation: Normal    ------------------------------------------ PROGRESS NOTES ------------------------------------------  Re-evaluation(s):  Time: Multiple. Patients symptoms show slight improvement  Repeat physical examination is not changed    Counseling:  I have spoken with the patient and discussed todays results, in addition to providing specific details for the plan of care and counseling regarding the diagnosis and prognosis. Their questions are answered at this time and they are agreeable with the plan of admission.    --------------------------------- ADDITIONAL PROVIDER NOTES ---------------------------------  Consultations:  Time: 2030. Spoke with Dr. Basil Flores. Discussed case. They will admit the patient. This patient's ED course included: a personal history and physicial examination, multiple bedside re-evaluations, IV medications, and cardiac monitoring    This patient has remained hemodynamically stable during their ED course. Diagnosis:  1. Volume depletion    2. MERON (acute kidney injury) ([address]    3. Orthostatic hypotension    4. Multiple falls        Disposition:  Patient's disposition: Admit to telemetry  Patient's condition is serious.          Ruth Ann Aleman MD  Resident  09/29/22 2618

## 2022-09-29 VITALS
SYSTOLIC BLOOD PRESSURE: 120 MMHG | BODY MASS INDEX: 20.72 KG/M2 | HEART RATE: 60 BPM | OXYGEN SATURATION: 97 % | RESPIRATION RATE: 18 BRPM | TEMPERATURE: 97.9 F | WEIGHT: 153 LBS | HEIGHT: 72 IN | DIASTOLIC BLOOD PRESSURE: 83 MMHG

## 2022-09-29 LAB
ALBUMIN SERPL-MCNC: 3.6 G/DL (ref 3.5–5.2)
ALP BLD-CCNC: 86 U/L (ref 40–129)
ALT SERPL-CCNC: 11 U/L (ref 0–40)
ANION GAP SERPL CALCULATED.3IONS-SCNC: 5 MMOL/L (ref 7–16)
ANION GAP SERPL CALCULATED.3IONS-SCNC: 8 MMOL/L (ref 7–16)
AST SERPL-CCNC: 13 U/L (ref 0–39)
BASOPHILS ABSOLUTE: 0.04 E9/L (ref 0–0.2)
BASOPHILS RELATIVE PERCENT: 0.6 % (ref 0–2)
BILIRUB SERPL-MCNC: <0.2 MG/DL (ref 0–1.2)
BUN BLDV-MCNC: 15 MG/DL (ref 6–23)
BUN BLDV-MCNC: 19 MG/DL (ref 6–23)
CALCIUM SERPL-MCNC: 9 MG/DL (ref 8.6–10.2)
CALCIUM SERPL-MCNC: 9.3 MG/DL (ref 8.6–10.2)
CHLORIDE BLD-SCNC: 109 MMOL/L (ref 98–107)
CHLORIDE BLD-SCNC: 114 MMOL/L (ref 98–107)
CO2: 24 MMOL/L (ref 22–29)
CO2: 28 MMOL/L (ref 22–29)
CREAT SERPL-MCNC: 0.9 MG/DL (ref 0.7–1.2)
CREAT SERPL-MCNC: 1.2 MG/DL (ref 0.7–1.2)
EKG ATRIAL RATE: 58 BPM
EKG P AXIS: 68 DEGREES
EKG P-R INTERVAL: 168 MS
EKG Q-T INTERVAL: 410 MS
EKG QRS DURATION: 96 MS
EKG QTC CALCULATION (BAZETT): 402 MS
EKG R AXIS: 50 DEGREES
EKG T AXIS: 57 DEGREES
EKG VENTRICULAR RATE: 58 BPM
EOSINOPHILS ABSOLUTE: 0.42 E9/L (ref 0.05–0.5)
EOSINOPHILS RELATIVE PERCENT: 6.3 % (ref 0–6)
GFR AFRICAN AMERICAN: >60
GFR AFRICAN AMERICAN: >60
GFR NON-AFRICAN AMERICAN: >60 ML/MIN/1.73
GFR NON-AFRICAN AMERICAN: >60 ML/MIN/1.73
GLUCOSE BLD-MCNC: 179 MG/DL (ref 74–99)
GLUCOSE BLD-MCNC: 89 MG/DL (ref 74–99)
HCT VFR BLD CALC: 40.2 % (ref 37–54)
HEMOGLOBIN: 12.9 G/DL (ref 12.5–16.5)
IMMATURE GRANULOCYTES #: 0.02 E9/L
IMMATURE GRANULOCYTES %: 0.3 % (ref 0–5)
LYMPHOCYTES ABSOLUTE: 1.36 E9/L (ref 1.5–4)
LYMPHOCYTES RELATIVE PERCENT: 20.5 % (ref 20–42)
MCH RBC QN AUTO: 31.2 PG (ref 26–35)
MCHC RBC AUTO-ENTMCNC: 32.1 % (ref 32–34.5)
MCV RBC AUTO: 97.3 FL (ref 80–99.9)
MONOCYTES ABSOLUTE: 0.67 E9/L (ref 0.1–0.95)
MONOCYTES RELATIVE PERCENT: 10.1 % (ref 2–12)
NEUTROPHILS ABSOLUTE: 4.14 E9/L (ref 1.8–7.3)
NEUTROPHILS RELATIVE PERCENT: 62.2 % (ref 43–80)
PDW BLD-RTO: 13.5 FL (ref 11.5–15)
PLATELET # BLD: 197 E9/L (ref 130–450)
PMV BLD AUTO: 9.9 FL (ref 7–12)
POTASSIUM REFLEX MAGNESIUM: 6.2 MMOL/L (ref 3.5–5)
POTASSIUM SERPL-SCNC: 4.2 MMOL/L (ref 3.5–5)
RBC # BLD: 4.13 E12/L (ref 3.8–5.8)
SODIUM BLD-SCNC: 141 MMOL/L (ref 132–146)
SODIUM BLD-SCNC: 147 MMOL/L (ref 132–146)
TOTAL PROTEIN: 5.4 G/DL (ref 6.4–8.3)
WBC # BLD: 6.7 E9/L (ref 4.5–11.5)

## 2022-09-29 PROCEDURE — 96361 HYDRATE IV INFUSION ADD-ON: CPT

## 2022-09-29 PROCEDURE — G0378 HOSPITAL OBSERVATION PER HR: HCPCS

## 2022-09-29 PROCEDURE — 2580000003 HC RX 258: Performed by: FAMILY MEDICINE

## 2022-09-29 PROCEDURE — 97161 PT EVAL LOW COMPLEX 20 MIN: CPT

## 2022-09-29 PROCEDURE — 36415 COLL VENOUS BLD VENIPUNCTURE: CPT

## 2022-09-29 PROCEDURE — 6370000000 HC RX 637 (ALT 250 FOR IP): Performed by: FAMILY MEDICINE

## 2022-09-29 PROCEDURE — 6360000002 HC RX W HCPCS: Performed by: FAMILY MEDICINE

## 2022-09-29 PROCEDURE — 96372 THER/PROPH/DIAG INJ SC/IM: CPT

## 2022-09-29 PROCEDURE — 80048 BASIC METABOLIC PNL TOTAL CA: CPT

## 2022-09-29 PROCEDURE — 85025 COMPLETE CBC W/AUTO DIFF WBC: CPT

## 2022-09-29 PROCEDURE — 80053 COMPREHEN METABOLIC PANEL: CPT

## 2022-09-29 PROCEDURE — 97165 OT EVAL LOW COMPLEX 30 MIN: CPT

## 2022-09-29 RX ORDER — LAMOTRIGINE 100 MG/1
100 TABLET ORAL 2 TIMES DAILY
Status: DISCONTINUED | OUTPATIENT
Start: 2022-09-29 | End: 2022-09-29 | Stop reason: HOSPADM

## 2022-09-29 RX ORDER — ENOXAPARIN SODIUM 100 MG/ML
40 INJECTION SUBCUTANEOUS DAILY
Status: DISCONTINUED | OUTPATIENT
Start: 2022-09-29 | End: 2022-09-29 | Stop reason: HOSPADM

## 2022-09-29 RX ORDER — PROMETHAZINE HYDROCHLORIDE 12.5 MG/1
12.5 TABLET ORAL EVERY 6 HOURS PRN
Status: DISCONTINUED | OUTPATIENT
Start: 2022-09-29 | End: 2022-09-29 | Stop reason: HOSPADM

## 2022-09-29 RX ORDER — DULOXETIN HYDROCHLORIDE 60 MG/1
60 CAPSULE, DELAYED RELEASE ORAL 2 TIMES DAILY
Status: DISCONTINUED | OUTPATIENT
Start: 2022-09-29 | End: 2022-09-29 | Stop reason: HOSPADM

## 2022-09-29 RX ORDER — BUSPIRONE HYDROCHLORIDE 10 MG/1
10 TABLET ORAL 3 TIMES DAILY
Status: DISCONTINUED | OUTPATIENT
Start: 2022-09-29 | End: 2022-09-29 | Stop reason: HOSPADM

## 2022-09-29 RX ORDER — LISINOPRIL 10 MG/1
20 TABLET ORAL DAILY
Status: DISCONTINUED | OUTPATIENT
Start: 2022-09-29 | End: 2022-09-29 | Stop reason: HOSPADM

## 2022-09-29 RX ORDER — SODIUM CHLORIDE 0.9 % (FLUSH) 0.9 %
10 SYRINGE (ML) INJECTION EVERY 12 HOURS SCHEDULED
Status: DISCONTINUED | OUTPATIENT
Start: 2022-09-29 | End: 2022-09-29 | Stop reason: HOSPADM

## 2022-09-29 RX ORDER — GABAPENTIN 300 MG/1
900 CAPSULE ORAL 3 TIMES DAILY
Status: DISCONTINUED | OUTPATIENT
Start: 2022-09-29 | End: 2022-09-29 | Stop reason: HOSPADM

## 2022-09-29 RX ORDER — LEVETIRACETAM 500 MG/1
750 TABLET ORAL 2 TIMES DAILY
Status: DISCONTINUED | OUTPATIENT
Start: 2022-09-29 | End: 2022-09-29 | Stop reason: HOSPADM

## 2022-09-29 RX ORDER — ATORVASTATIN CALCIUM 20 MG/1
20 TABLET, FILM COATED ORAL NIGHTLY
Status: DISCONTINUED | OUTPATIENT
Start: 2022-09-29 | End: 2022-09-29 | Stop reason: HOSPADM

## 2022-09-29 RX ORDER — ONDANSETRON 2 MG/ML
4 INJECTION INTRAMUSCULAR; INTRAVENOUS EVERY 6 HOURS PRN
Status: DISCONTINUED | OUTPATIENT
Start: 2022-09-29 | End: 2022-09-29 | Stop reason: HOSPADM

## 2022-09-29 RX ORDER — CLONAZEPAM 0.5 MG/1
1 TABLET ORAL 2 TIMES DAILY PRN
Status: DISCONTINUED | OUTPATIENT
Start: 2022-09-29 | End: 2022-09-29 | Stop reason: HOSPADM

## 2022-09-29 RX ORDER — ACETAMINOPHEN 650 MG/1
650 SUPPOSITORY RECTAL EVERY 6 HOURS PRN
Status: DISCONTINUED | OUTPATIENT
Start: 2022-09-29 | End: 2022-09-29 | Stop reason: HOSPADM

## 2022-09-29 RX ORDER — ASPIRIN 81 MG/1
81 TABLET ORAL DAILY
Status: DISCONTINUED | OUTPATIENT
Start: 2022-09-29 | End: 2022-09-29 | Stop reason: HOSPADM

## 2022-09-29 RX ORDER — POLYETHYLENE GLYCOL 3350 17 G/17G
17 POWDER, FOR SOLUTION ORAL DAILY PRN
Status: DISCONTINUED | OUTPATIENT
Start: 2022-09-29 | End: 2022-09-29 | Stop reason: HOSPADM

## 2022-09-29 RX ORDER — SODIUM CHLORIDE 9 MG/ML
INJECTION, SOLUTION INTRAVENOUS CONTINUOUS
Status: DISCONTINUED | OUTPATIENT
Start: 2022-09-29 | End: 2022-09-29 | Stop reason: HOSPADM

## 2022-09-29 RX ORDER — SODIUM CHLORIDE 0.9 % (FLUSH) 0.9 %
10 SYRINGE (ML) INJECTION PRN
Status: DISCONTINUED | OUTPATIENT
Start: 2022-09-29 | End: 2022-09-29 | Stop reason: HOSPADM

## 2022-09-29 RX ORDER — AMLODIPINE BESYLATE 10 MG/1
10 TABLET ORAL DAILY
Status: DISCONTINUED | OUTPATIENT
Start: 2022-09-29 | End: 2022-09-29 | Stop reason: HOSPADM

## 2022-09-29 RX ORDER — SODIUM CHLORIDE 9 MG/ML
INJECTION, SOLUTION INTRAVENOUS PRN
Status: DISCONTINUED | OUTPATIENT
Start: 2022-09-29 | End: 2022-09-29 | Stop reason: HOSPADM

## 2022-09-29 RX ORDER — ACETAMINOPHEN 325 MG/1
650 TABLET ORAL EVERY 6 HOURS PRN
Status: DISCONTINUED | OUTPATIENT
Start: 2022-09-29 | End: 2022-09-29 | Stop reason: HOSPADM

## 2022-09-29 RX ADMIN — LEVETIRACETAM 750 MG: 500 TABLET, FILM COATED ORAL at 09:38

## 2022-09-29 RX ADMIN — ATORVASTATIN CALCIUM 20 MG: 20 TABLET, FILM COATED ORAL at 00:25

## 2022-09-29 RX ADMIN — ASPIRIN 81 MG: 81 TABLET, COATED ORAL at 09:38

## 2022-09-29 RX ADMIN — SODIUM CHLORIDE: 9 INJECTION, SOLUTION INTRAVENOUS at 02:38

## 2022-09-29 RX ADMIN — GABAPENTIN 900 MG: 300 CAPSULE ORAL at 09:38

## 2022-09-29 RX ADMIN — LEVETIRACETAM 750 MG: 500 TABLET, FILM COATED ORAL at 00:25

## 2022-09-29 RX ADMIN — CLONAZEPAM 1 MG: 0.5 TABLET ORAL at 00:25

## 2022-09-29 RX ADMIN — DULOXETINE HYDROCHLORIDE 60 MG: 60 CAPSULE, DELAYED RELEASE ORAL at 11:12

## 2022-09-29 RX ADMIN — ACETAMINOPHEN 650 MG: 325 TABLET, FILM COATED ORAL at 00:25

## 2022-09-29 RX ADMIN — BUSPIRONE HYDROCHLORIDE 10 MG: 10 TABLET ORAL at 11:12

## 2022-09-29 RX ADMIN — ENOXAPARIN SODIUM 40 MG: 100 INJECTION SUBCUTANEOUS at 09:38

## 2022-09-29 RX ADMIN — LAMOTRIGINE 100 MG: 100 TABLET ORAL at 11:13

## 2022-09-29 ASSESSMENT — PAIN SCALES - GENERAL: PAINLEVEL_OUTOF10: 3

## 2022-09-29 NOTE — PLAN OF CARE
Pt is educated on fall preventions and is recommended to call when he needs to exit the bed. Pt understands care plan and states he will be compliant. BP and vitals will be monitored.

## 2022-09-29 NOTE — ED NOTES
Pt Aox3, sitting on bed, easy respirations, stated he still feels dizzy, meal given to pt.       Nii Clemons RN  09/28/22 2032

## 2022-09-29 NOTE — DISCHARGE SUMMARY
Hospitalist Discharge Summary    Patient ID: Obed Watkins   Patient : 1955  Patient's PCP: SAI Fiore CNP    Admit Date: 2022   Admitting Physician: Felipe Serra DO    Discharge Date:  2022   Discharge Physician: SAI Davis NP   Discharge Condition: Stable  Discharge Disposition: [facility] course in brief:  (Please refer to daily progress notes for a comprehensive review of the hospitalization by requesting medical records)    Patient admitted for dehydration. Patient presented to the ED from the [place] where he was having a routine psychiatric visit after a fall secondary to lightheadedness and dizziness. He was noted to be hypotensive at the time. He reports lightheadedness for weeks along with frequent falls. In ED BPs were soft responsive to fluid resuscitation. He was also with an MERON and elevated troponin with negative delta. IV fluids were initiated. Hypotension, orthostasis, and Meron resolved with IVF. Patient is feeling better. He is now stable for discharge home with OP follow up. Consults:   IP CONSULT TO HOSPITALIST    Discharge Diagnoses:  MERON - RESOLVED  Dehydration- RESOLVED  Orthostatic hypotension - RESOLVED  Seizure disorder  Hx of chronic hypoxic respiratory failure - RESOLVED Pt tolerates room air and no longer requires home oxygen  Depression    Discharge Instructions / Follow up: Follow-up with PCP within 1 week of discharge. Compliance with medications as prescribed on discharge. The patient's condition is stable. At this time the patient is without objective evidence of an acute process requiring continuing hospitalization or inpatient management. They are stable for discharge with outpatient follow-up. I have spoken with the patient and discussed the results of the current hospitalization, in addition to providing specific details for the plan of care and counseling regarding the diagnosis and prognosis.   The plan has been discussed in detail and they are aware of the specific conditions for emergent return, as well as the importance of follow-up. Their questions are answered at this time and they are agreeable with the plan for discharge home. Continued appropriate risk factor modification of blood pressure, diabetes and serum lipids will remain essential to reducing risk of future atherosclerotic development    Activity: activity as tolerated      Significant labs:  CBC:   Recent Labs     09/28/22  1452 09/29/22  0554   WBC 8.8 6.7   RBC 4.58 4.13   HGB 14.3 12.9   HCT 42.9 40.2   MCV 93.7 97.3   RDW 13.4 13.5    197     BMP:   Recent Labs     09/28/22  1452 09/29/22  0554 09/29/22  1320    147* 141   K 4.9 6.2* 4.2    114* 109*   CO2 22 28 24   BUN 24* 19 15   CREATININE 1.4* 1.2 0.9     LFT:  Recent Labs     09/28/22  1452 09/29/22  0554   PROT 6.2* 5.4*   ALKPHOS 97 86   ALT 12 11   AST 19 13   BILITOT <0.2 <0.2     PT/INR: No results for input(s): INR, APTT in the last 72 hours. BNP: No results for input(s): BNP in the last 72 hours.   Hgb A1C:   Lab Results   Component Value Date    LABA1C 5.9 (H) 07/24/2020     Folate and B12:   Lab Results   Component Value Date    DISKANUN07 601 01/23/2022   , No results found for: FOLATE  Thyroid Studies:   Lab Results   Component Value Date    TSH 1.390 01/23/2022       Urinalysis:    Lab Results   Component Value Date/Time    NITRU Negative 01/21/2022 08:57 PM    WBCUA 0-1 01/21/2022 08:57 PM    WBCUA 10-20 08/21/2011 02:20 AM    BACTERIA NONE SEEN 01/21/2022 08:57 PM    RBCUA 1-3 01/21/2022 08:57 PM    RBCUA 0-1 08/21/2011 02:20 AM    BLOODU MODERATE 01/21/2022 08:57 PM    SPECGRAV 1.020 01/21/2022 08:57 PM    GLUCOSEU Negative 01/21/2022 08:57 PM    GLUCOSEU NEGATIVE 08/21/2011 02:20 AM       Imaging:  CT Head WO Contrast    Result Date: 9/28/2022  EXAMINATION: CT OF THE HEAD WITHOUT CONTRAST  9/28/2022 4:11 pm TECHNIQUE: CT of the head was performed without the administration of intravenous contrast. Automated exposure control, iterative reconstruction, and/or weight based adjustment of the mA/kV was utilized to reduce the radiation dose to as low as reasonably achievable. COMPARISON: CT head without contrast, 01/22/2022 HISTORY: ORDERING SYSTEM PROVIDED HISTORY: dizzy, multiple falls TECHNOLOGIST PROVIDED HISTORY: Reason for exam:->dizzy, multiple falls Has a \"code stroke\" or \"stroke alert\" been called? ->No Decision Support Exception - unselect if not a suspected or confirmed emergency medical condition->Emergency Medical Condition (MA) What reading provider will be dictating this exam?->CRC FINDINGS: BRAIN/VENTRICLES: No mass effect, edema or hemorrhage is seen. Chronic lacunar infarctions are seen in the left basal ganglia. Minimal age-appropriate cerebral volume loss is noted. No hydrocephalus or extra-axial fluid is seen. ORBITS: The visualized portion of the orbits demonstrate no acute abnormality. SINUSES: Moderate mucosal thickening in the left maxillary sinus. Mild mucosal thickening in the ethmoid sinuses. The remainder of the visualized paranasal sinuses are clear. Status post right mastoidectomy. The surgical cavity is clear. The few remaining right mastoid air cells are opacified. The left mastoid air cells are clear. SOFT TISSUES/SKULL:  No acute abnormality of the visualized skull or soft tissues. 1.  No acute intracranial abnormality. 2. Chronic lacunar infarctions in the left basal ganglia. XR CHEST PORTABLE    Result Date: 9/28/2022  EXAMINATION: ONE XRAY VIEW OF THE CHEST 9/28/2022 2:11 pm COMPARISON: None. HISTORY: ORDERING SYSTEM PROVIDED HISTORY: dizzy TECHNOLOGIST PROVIDED HISTORY: Reason for exam:->dizzy What reading provider will be dictating this exam?->CRC FINDINGS: The lungs are without acute focal process. There is no effusion or pneumothorax. The cardiomediastinal silhouette is without acute process.  The osseous structures are without acute process. No acute process. Discharge Medications:      Medication List        CONTINUE taking these medications      aspirin 81 MG EC tablet     atorvastatin 20 MG tablet  Commonly known as: LIPITOR     busPIRone 10 MG tablet  Commonly known as: BUSPAR     clonazePAM 1 MG tablet  Commonly known as: KLONOPIN     diclofenac sodium 1 % Gel  Commonly known as: VOLTAREN     DULoxetine 60 MG extended release capsule  Commonly known as: CYMBALTA     gabapentin 300 MG capsule  Commonly known as: NEURONTIN     lamoTRIgine 100 MG tablet  Commonly known as: LAMICTAL     levETIRAcetam 750 MG tablet  Commonly known as: KEPPRA  Take 1 tablet by mouth 2 times daily            STOP taking these medications      amLODIPine 10 MG tablet  Commonly known as: NORVASC     lisinopril 20 MG tablet  Commonly known as: PRINIVIL;ZESTRIL              Time Spent on discharge is more than 45 minutes in the examination, evaluation, counseling and review of medications and discharge plan.    +++++++++++++++++++++++++++++++++++++++++++++++++  SAI Scruggs NP  C/ Jonathan [address]  +++++++++++++++++++++++++++++++++++++++++++++++++  NOTE: This report was transcribed using voice recognition software. Every effort was made to ensure accuracy; however, inadvertent computerized transcription errors may be present.

## 2022-09-29 NOTE — CARE COORDINATION
9/29, Patient admitted for dehydration. SW met with patient at bedside to discuss transition of care/discharge plan and SW role. Discharge plan will be home once medically cleared for discharge. Patient lives with finance in a home that he says he will no longer be at after the 30th of September (tomorrow). Patient has been evicted and says he has no place to go. Discussed various options for patient. Assisted patient with contacting the [employer]. Patient contacting them to see if he could get a delay in his eviction from the home he was renting. Gave patient information on various mobile park homes (as he requested) and 211 along with homeless shelter list.  DME owned is [employer] and [employer]. Patient is currently on room air. Patient says he does not use any 02 at home and the 02 tanks from [facility] are at the home in the basement. Contacted An at [facility] DME on equipment at home. Juanita Lopez will need a DR or NP note stating that patient is no longer on 02 -from there equipment can then be picked up at the home. Informed Juanita Lopez that patient is being evicted by tomorrow from the home. PCP is AMOS Tsai at the [place]. Contact was made to [facility] and spoke to Fahad Carpenter  at [phone] ext [phone] on patient admitted. Faxed clinicals to [employer] at [phone]. Discussed ACP and contacts patient has listed. Patient does not want either sons currently listed to be decision makers at this time. Patient has no one else he wants listed at this time. No needs identified at this time. Fiance to be transportation for patient upon discharge. SW to follow for further discharge needs.       Kim Stoll, [facility] Case Management  [phone]

## 2022-09-29 NOTE — PROGRESS NOTES
[address]      Date:2022                                                  Patient Name: Leslee Bacon  MRN: [id number]  : 1955  Room: [address] [place] [address]    Evaluating OT: KAROL Garzon, OTR/L  # [id number]    Referring Provider:  Natalia Melgar DO  Specific Provider Orders:  Osvaldo Bowen and Treat\"  22    Diagnosis: Dehydration [E86.0]    Pt was admitted after falling r/t dizziness/lightheadedness while attending office visit at [facility]    Pertinent Medical History:  Pt has a past medical history of Back pain, chronic, Brain tumor [facility]), Chest pain, Herniated disc, Marijuana use, Seizure disorder ([facility] Seizures ([facility] Tobacco abuse, Tobacco abuse, and Unspecified cerebral artery occlusion with cerebral infarction. ,  has a past surgical history that includes Eye surgery (Bilateral); Tonsillectomy; External ear surgery; cervical fusion; and brain surgery.     Surgeries this admission: None     Precautions:  Fall Risk  Hypotension    Assessment of current deficits   [x] Functional mobility  [x]ADLs  [x] Strength               []Cognition   [x] Functional transfers   [x] IADLs         [x] Safety Awareness   [x]Endurance   [] Fine Coordination              [x] Balance     [] Vision/perception   []Sensation    []Gross Motor Coordination  [] ROM  [] Delirium                  [] Motor Control       OT PLAN OF CARE   OT POC based on physician orders, patient diagnosis and results of clinical assessment    Frequency/Duration 1-3 days/wk for 2 weeks PRN   Specific OT Treatment to include:     * Instruction/training on adapted ADL techniques and AE recommendations to increase functional independence within precautions       * Training on energy conservation strategies, correct breathing pattern and techniques to improve independence/tolerance for self-care routine  * Functional transfer/mobility training/DME recommendations for increased independence, safety, and fall prevention  * Patient/Family education to increase follow through with safety techniques and functional independence  * Recommendation of environmental modifications for increased safety with functional transfers/mobility and ADLs  * Cognitive retraining/development of therapeutic activities to improve problem solving, judgement, memory, and attention for increased safety/participation in ADL/IADL tasks  * Therapeutic exercise to improve motor endurance, ROM, and functional strength for ADLs/functional transfers  * Therapeutic activities to facilitate/challenge dynamic balance, stand tolerance for increased safety and independence with ADLs  * Therapeutic activities to facilitate gross/fine motor skills for increased independence with ADLs  * Neuro-muscular re-education: facilitation of righting/equilibrium reactions  * Positioning to improve skin integrity, interaction with environment and functional independence  * Delirium prevention/treatment  * Manual techniques for edema management  Other:    Recommended Adaptive Equipment: TBD as pt progresses       Home Living:  Pt lives alone in a single-story house. Bed/bath on the main floor. (?) Basement. Bathroom setup:  Tub-Shower, Standard-height Commode   Equipment owned:  [employer]    Available Family Assist:  None reported    Prior Level of Function:  Pt reported being IND w/ all ADLs, IADLs, Transfers and Mobility using No AD for ambulation. Driving:  No  Occupation:  Disabled [employer]    Pain Level:  denied pain    Additional Complaints:  Denied lightheadedness during session    Cognition: A & O x 4   Able to Follow Multi-Step Commands INDly   Memory:  good    Sequencing:  good    Problem solving:  good    Judgement/safety:  good (-)  Additional Comments:  Pt was pleasant and cooperative.   Somewhat anxious about current living situation - reported being evicted recently, having only 10 days to pack his belongings \"It's really stressing me out - physically and emotionally. \"    Vitals/Lab Values:  BP Seated EOB = 104/58  Standing 90/54           Functional Assessment:  AM-PAC Daily Activity Raw Score: 18/24     Initial Eval Status  Date: 9-29-22   Treatment Status  Date: STGs = LTGs  Time frame: 10-14 days   Feeding IND      NA   Grooming SUP/Set up    Simulated - seated d/t hypotension    Mod I  Standing at the Sink   UB Dressing SUP/Set up    Simulated - seated  Unsafe for item retrieval for activities d/t hypotension    Mod I     LB Dressing Min A/Set up    Simulated - seated/standing  Pt fully dressed in street clothes - able to don/doff footwear w/ use of cross-legged tech  Pt ed for safe/adaptive techs, use of adaptive equip    Mod I     Bathing NT    Pt ed re: Benefits of use of Shower chair/Tub Bench, resources for obtaining equip    Mod I      Toileting NT    Pt reported being IND with toileting tasks in bathroom    Mod I     Bed Mobility  Supine to sit: NT   Sit to supine:  NT       Supine to sit: IND  Sit to supine: IND     Functional Transfers Close SUP    EOB, chair  Pt ed for safety/hand placement    Mod I     Functional Mobility Close SUP    Short distances at b/s w/o AD  Pt ed for safety/improved safety awareness    Mod I     Balance Sitting:     Static:  Remote SUP EOB    Dynamic:  SUP w/ functional ax EOB     Standing:     Static:  Close SUP w/o AD    Dynamic:  Close SUP w/ functional ax/mobility w/o AD    Sitting:     Static:  IND    Dynamic:  IND w/ functional ax    Standing:     Static:  Mod I w/ AD PRN    Dynamic:  Mod I w/ functional ax/mobility w/ AD PRN   Activity Tolerance Fair(+)    Limited by mild lightheadedness/hypotension    Good   Visual/  Perceptual    Hearing:  WNL   Glasses: Reading    WFL   Hearing Aids:  No               Hand Dominance: Right   AROM Strength Additional Info:    RUE  WFL WNL Good ;   Good FMC/dexterity noted during ADL tasks     LUE WFL WNL Good ;    Good FMC/dexterity noted during ADL tasks       Sensation:  Denies numbness or tingling Alvaro UEs   Tone: WFL Alvaro UEs   Edema: None Noted Alvaro UEs     Comments: Upon arrival, patient was found seated EOB. He was agreeable to participate in therapeutic ax. No Family present during session. Received permission from RN prior to engaging pt in OT services. Educated pt on role of OT services. At the end of the session, patient requested to sit EOB - declined to remain in b/s chair. Call light and phone within reach, all lines and tubes intact. Oriented pt to call bell. Made all appropriate Environmental Modifications to facilitate pt's level of IND and safety. All needs met. RN made aware of pt's assessment performance         Overall patient demonstrated decreased independence and safety during completion of ADL/functional transfer/mobility tasks. Pt would benefit from continued skilled OT to increase safety and independence with completion of ADL/IADL tasks for functional independence and quality of life.     Treatment: OT treatment provided this date includes:   Instruction/training on safety and adapted techniques for completion of ADLs, use of DME/AD/Adaptive equip:    Instruction/training on safe functional mobility/transfer techniques, use of DME/AD:    Instruction/training on energy conservation techs (EC)/Pursed-Lip Breathing (PLB)/work simplification for completion of ADLs:     Neuromuscular Reeducation to facilitate balance/righting reactions for increased function with ADLs:    Skilled positioning/alignment for Pain Mgmt, Skin Integrity, Edema Control, to maximize Pt's safety and ability to [facility] interact w/ his/her environment, maximize respiratory status  Activity tolerance - Sitting/Standing to improve endurance w/ functional ax   Cognitive retraining -  Oriented pt to current Date, Place and Situation; Cues for safety/safety awareness, sequencing, problem solving    Skilled monitoring of Vitals during session and pt's response to tx ax      Consulted RN    Made all appropriate Environmental Modifications to facilitate pt's level of IND and safety. Recommendations for Continued Participation in OT services during Hospitalization and at D/C     Pt and/or Family verbalized/demonstrated a Good understanding of education provided. Will Review PRN. Rehab Potential: Good for established goals     Patient / Family Goal: Not stated at this time      Patient and/or family were instructed on functional diagnosis, prognosis/goals and OT plan of care. Demonstrated Good understanding. Eval Complexity: Low    Time In: 1016  Time Out: 1035  Total Treatment Time: 4 minutes    Min Units   OT Eval Low 97165  X  1   OT Eval Medium 98194      OT Eval High 34064      OT Re-Eval [id number]       Therapeutic Ex 69605       Therapeutic Activities 95474       ADL/Self Care 72647  4  0   Orthotic Management 34893       Manual 93417     Neuro Re-Ed 54336       Non-Billable Time              Evaluation Time additionally includes thorough review of current medical information, gathering information on past medical history/social history and prior level of function, completion of standardized testing/informal observation of tasks, assessment of data and education on plan of care and goals.             Miesha Rodriguez, MOT, OTR/L  # [id number]

## 2022-09-29 NOTE — H&P
Hospitalist History & Physical      PCP: SIGRID VILLASENOR, APRN - CNP    Date of Service: Pt seen/examined on 9/28/2022     Chief Complaint:  had concerns including Fall (VA hypotension and fell, weak, \"dizzy for weeks\" no pain, states been falling alot ). History Of Present Illness:    Mr. Sonia Payne, a 79y.o. year old male  who  has a past medical history of Back pain, chronic, Brain tumor ([address] Chest pain, Herniated disc, Marijuana use, Seizure disorder ([address] Seizures ([address] Tobacco abuse, Tobacco abuse, and Unspecified cerebral artery occlusion with cerebral infarction. Patient presents to the emergency room for a fall due to lightheadedness/dizziness while he was at a routine psychiatric visit at the [facility]. He was also noted to be hypotensive at the time. Patient says he has been lightheaded for weeks but without any other symptoms really except for frequent falls. He denies any chest pain, shortness of breath, headache, changes in vision, numbness or tingling, abdominal pain, vomiting or diarrhea, urinary or other bowel symptoms. His symptoms have been intermittent, moderate to severe, not appearing to be relieved by anything. Vital signs show the blood pressure to be a bit soft with 87/49. After fluid resuscitation pressure went up to 114/75 but started to come back down to the 90s again. Laboratory studies demonstrate BUN 24, creatinine 1.4, troponin 17 with repeat of 15. CT head showed no acute findings. Patient was determined to be dehydrated. Medicine consulted for admission.       Past Medical History:   Diagnosis Date    Back pain, chronic     Brain tumor ([address] unk    Chest pain 9/16/2014    Herniated disc     Marijuana use 9/16/2014    Seizure disorder ([address]     Seizures ([address]     Tobacco abuse     Tobacco abuse 9/16/2014    Unspecified cerebral artery occlusion with cerebral infarction        Past Surgical History:   Procedure Laterality Date    BRAIN SURGERY      CERVICAL FUSION      EXTERNAL EAR SURGERY      EYE SURGERY Bilateral     TONSILLECTOMY         Prior to Admission medications    Medication Sig Start Date End Date Taking? Authorizing Provider   lisinopril (PRINIVIL;ZESTRIL) 20 MG tablet Take 20 mg by mouth daily   Yes Historical Provider, MD   diclofenac sodium (VOLTAREN) 1 % GEL Apply 4 g topically 2 times daily Apply to right shoulder   Yes Historical Provider, MD   DULoxetine (CYMBALTA) 60 MG extended release capsule Take 60 mg by mouth 2 times daily   Yes Historical Provider, MD   atorvastatin (LIPITOR) 20 MG tablet Take 20 mg by mouth at bedtime   Yes Historical Provider, MD   clonazePAM (KLONOPIN) 1 MG tablet Take 1 mg by mouth 2 times daily as needed for Anxiety. Historical Provider, MD   amLODIPine (NORVASC) 10 MG tablet Take 10 mg by mouth daily    Historical Provider, MD   busPIRone (BUSPAR) 10 MG tablet Take 10 mg by mouth 3 times daily    Historical Provider, MD   lamoTRIgine (LAMICTAL) 100 MG tablet Take 100 mg by mouth 2 times daily    Historical Provider, MD   aspirin 81 MG EC tablet Take 81 mg by mouth daily    Historical Provider, MD   levETIRAcetam (KEPPRA) 750 MG tablet Take 1 tablet by mouth 2 times daily 6/26/17   Elias Rajan., MD   gabapentin (NEURONTIN) 300 MG capsule Take 900 mg by mouth 3 times daily. Historical Provider, MD         Allergies:  Tramadol    Social History:    TOBACCO:   reports that he has been smoking cigarettes. He has a 20.00 pack-year smoking history. He has never used smokeless tobacco.  ETOH:   reports no history of alcohol use. Family History:    Reviewed in detail and negative for DM, CAD, Cancer, CVA. Positive as follows\"      Problem Relation Age of Onset    Substance Abuse Father         alcohol    Seizures Son        REVIEW OF SYSTEMS:   Pertinent positives as noted in the HPI. All other systems reviewed and negative.     PHYSICAL EXAM:  BP (!) 99/51   Pulse 58   Temp 97.4 °F (36.3 °C) (Oral)   Resp 16 Wt 153 lb (69.4 kg)   SpO2 97%   BMI 20.75 kg/m²   General appearance: No apparent distress, appears stated age and cooperative. HEENT: Normal cephalic, atraumatic without obvious deformity. Pupils equal, round, and reactive to light. Extra ocular muscles intact. Conjunctivae/corneas clear. Neck: Supple, with full range of motion. No jugular venous distention. Trachea midline. Respiratory: CTA  Cardiovascular: RRR  Abdomen: S/NT/ND  Musculoskeletal: No clubbing, cyanosis, edema of bilateral lower extremities. Brisk capillary refill. Skin: Normal skin color. No rashes or lesions. Neurologic:  Neurovascularly intact without any focal sensory/motor deficits. Cranial nerves: II-XII intact, grossly non-focal.  Psychiatric: Alert and oriented, thought content appropriate, normal insight    Reviewed EKG and CXR personally      CBC:   Recent Labs     09/28/22  1452   WBC 8.8   RBC 4.58   HGB 14.3   HCT 42.9   MCV 93.7   RDW 13.4        BMP:   Recent Labs     09/28/22  1452      K 4.9      CO2 22   BUN 24*   CREATININE 1.4*     LFT:  Recent Labs     09/28/22  1452   PROT 6.2*   ALKPHOS 97   ALT 12   AST 19   BILITOT <0.2     CE:  No results for input(s): Tina Zaki in the last 72 hours. PT/INR: No results for input(s): INR, APTT in the last 72 hours. BNP: No results for input(s): BNP in the last 72 hours.   ESR:   Lab Results   Component Value Date    SEDRATE 2 05/13/2017     CRP: No results found for: CRP  D Dimer:   Lab Results   Component Value Date    DDIMER <200 09/15/2014      Folate and B12:   Lab Results   Component Value Date    FEGBKESJ54 105 01/23/2022   , No results found for: FOLATE  Lactic Acid:   Lab Results   Component Value Date    LACTA 1.2 07/23/2020     Thyroid Studies:   Lab Results   Component Value Date    TSH 1.390 01/23/2022       Oupatient labs:  Lab Results   Component Value Date    CHOL 167 07/24/2020    TRIG 294 (H) 07/24/2020    HDL 28 07/24/2020 LDLCALC 80 07/24/2020    TSH 1.390 01/23/2022    INR 1.0 07/23/2020    LABA1C 5.9 (H) 07/24/2020       Urinalysis:    Lab Results   Component Value Date/Time    NITRU Negative 01/21/2022 08:57 PM    WBCUA 0-1 01/21/2022 08:57 PM    WBCUA 10-20 08/21/2011 02:20 AM    BACTERIA NONE SEEN 01/21/2022 08:57 PM    RBCUA 1-3 01/21/2022 08:57 PM    RBCUA 0-1 08/21/2011 02:20 AM    BLOODU MODERATE 01/21/2022 08:57 PM    SPECGRAV 1.020 01/21/2022 08:57 PM    GLUCOSEU Negative 01/21/2022 08:57 PM    GLUCOSEU NEGATIVE 08/21/2011 02:20 AM       Imaging:  CT Head WO Contrast    Result Date: 9/28/2022  EXAMINATION: CT OF THE HEAD WITHOUT CONTRAST  9/28/2022 4:11 pm TECHNIQUE: CT of the head was performed without the administration of intravenous contrast. Automated exposure control, iterative reconstruction, and/or weight based adjustment of the mA/kV was utilized to reduce the radiation dose to as low as reasonably achievable. COMPARISON: CT head without contrast, 01/22/2022 HISTORY: ORDERING SYSTEM PROVIDED HISTORY: dizzy, multiple falls TECHNOLOGIST PROVIDED HISTORY: Reason for exam:->dizzy, multiple falls Has a \"code stroke\" or \"stroke alert\" been called? ->No Decision Support Exception - unselect if not a suspected or confirmed emergency medical condition->Emergency Medical Condition (MA) What reading provider will be dictating this exam?->CRC FINDINGS: BRAIN/VENTRICLES: No mass effect, edema or hemorrhage is seen. Chronic lacunar infarctions are seen in the left basal ganglia. Minimal age-appropriate cerebral volume loss is noted. No hydrocephalus or extra-axial fluid is seen. ORBITS: The visualized portion of the orbits demonstrate no acute abnormality. SINUSES: Moderate mucosal thickening in the left maxillary sinus. Mild mucosal thickening in the ethmoid sinuses. The remainder of the visualized paranasal sinuses are clear. Status post right mastoidectomy. The surgical cavity is clear.   The few remaining right mastoid air cells are opacified. The left mastoid air cells are clear. SOFT TISSUES/SKULL:  No acute abnormality of the visualized skull or soft tissues. 1.  No acute intracranial abnormality. 2. Chronic lacunar infarctions in the left basal ganglia. XR CHEST PORTABLE    Result Date: 9/28/2022  EXAMINATION: ONE XRAY VIEW OF THE CHEST 9/28/2022 2:11 pm COMPARISON: None. HISTORY: ORDERING SYSTEM PROVIDED HISTORY: dizzy TECHNOLOGIST PROVIDED HISTORY: Reason for exam:->dizzy What reading provider will be dictating this exam?->CRC FINDINGS: The lungs are without acute focal process. There is no effusion or pneumothorax. The cardiomediastinal silhouette is without acute process. The osseous structures are without acute process. No acute process. ASSESSMENT:  -Dehydration  -Acute renal failure  -Orthostatic hypotension  -Seizure disorder  -Depression      PLAN:  -Admit to medicine  -Normal saline 125 mL/h  -Monitor renal function  -Continue home medications        Diet: No diet orders on file  Code Status: Prior  Surrogate decision maker confirmed with patient:   Extended Emergency Contact Information  Primary Emergency Contact: [address] [place] [address] Phone: [phone]  Mobile Phone: [phone]  Relation: Child  Secondary Emergency Contact: Federico Yanes [facility] Phone: [phone]  Relation: Child    DVT Prophylaxis: []Lovenox []Heparin []PCD [] [address] []Encouraged ambulation  Disposition: []Med/Surg [] Intermediate [] ICU/CCU  Admit status: [] Observation [] Inpatient     +++++++++++++++++++++++++++++++++++++++++++++++++  Candida Johana, DO  +++++++++++++++++++++++++++++++++++++++++++++++++  NOTE: This report was transcribed using voice recognition software. Every effort was made to ensure accuracy; however, inadvertent computerized transcription errors may be present.

## 2022-09-29 NOTE — PROGRESS NOTES
Hospitalist Progress Note      Synopsis: Patient admitted for dehydration. Patient presented to the ED from the [place] where he was having a routine psychiatric visit after a fall secondary to lightheadedness and dizziness. He was noted to be hypotensive at the time. He reports lightheadedness for weeks along with frequent falls. In ED BPs were soft responsive to fluid resuscitation. He was also with an MERON and elevated troponin with negative delta. IV fluids were initiated. Hospital day 0     Subjective:  Stable overnight. No issues reported. Patient seen and examined  Records reviewed. Temp (24hrs), Av.5 °F (36.4 °C), Min:97 °F (36.1 °C), Max:97.9 °F (36.6 °C)    DIET: ADULT DIET; Regular  CODE: Full Code  No intake or output data in the 24 hours ending 22 0817    Review of Systems: All bolded are positive; please see HPI  General:  Fever, chills, diaphoresis, fatigue, malaise, night sweats, weight loss  Psychological:  Anxiety, disorientation, hallucinations. ENT:  Epistaxis, headaches, vertigo, visual changes. Cardiovascular:  Chest pain, irregular heartbeats, palpitations, paroxysmal nocturnal dyspnea. Respiratory:  Shortness of breath, coughing, sputum production, hemoptysis, wheezing, orthopnea.   Gastrointestinal:  Nausea, vomiting, diarrhea, heartburn, constipation, abdominal pain, hematemesis, hematochezia, melena, acholic stools  Genito-Urinary:  Dysuria, urgency, frequency, hematuria  Musculoskeletal:  Joint pain, joint stiffness, joint swelling, muscle pain  Neurology:  Headache, focal neurological deficits, weakness, numbness, paresthesia  Derm:  Rashes, ulcers, excoriations, bruising  Extremities:  Decreased ROM, peripheral edema, mottling    Objective:    /83   Pulse 60   Temp 97.9 °F (36.6 °C) (Temporal)   Resp 18   Ht 6' (1.829 m)   Wt 153 lb (69.4 kg)   SpO2 97%   BMI 20.75 kg/m²     General appearance: No apparent distress, appears stated age and cooperative. HEENT: Conjunctivae/corneas clear. Mucous membranes moist.  Neck: Supple. No JVD. Respiratory:  Clear to auscultation bilaterally. Normal respiratory effort. Cardiovascular:  RRR. S1, S2 without MRG. PV: Pulses palpable. No edema. Abdomen: Soft, non-tender, non-distended. +BS  Musculoskeletal: No obvious deformities. Skin: Normal skin color. No rashes or lesions. Good turgor. Neurologic:  Grossly non-focal. Awake, alert, following commands. Psychiatric: Alert and oriented, thought content appropriate, normal insight and judgement    Medications:  REVIEWED DAILY    Infusion Medications    sodium chloride      sodium chloride 125 mL/hr at 09/29/22 0327     Scheduled Medications    aspirin  81 mg Oral Daily    atorvastatin  20 mg Oral Nightly    busPIRone  10 mg Oral TID    DULoxetine  60 mg Oral BID    gabapentin  900 mg Oral TID    lamoTRIgine  100 mg Oral BID    levETIRAcetam  750 mg Oral BID    lisinopril  20 mg Oral Daily    sodium chloride flush  10 mL IntraVENous 2 times per day    enoxaparin  40 mg SubCUTAneous Daily     PRN Meds: clonazePAM, sodium chloride flush, sodium chloride, promethazine **OR** ondansetron, polyethylene glycol, acetaminophen **OR** acetaminophen    Labs:     Recent Labs     09/28/22  1452 09/29/22  0554   WBC 8.8 6.7   HGB 14.3 12.9   HCT 42.9 40.2    197       Recent Labs     09/28/22  1452 09/29/22  0554    147*   K 4.9 6.2*    114*   CO2 22 28   BUN 24* 19   CREATININE 1.4* 1.2   CALCIUM 9.6 9.3       Recent Labs     09/28/22  1452 09/29/22  0554   PROT 6.2* 5.4*   ALKPHOS 97 86   ALT 12 11   AST 19 13   BILITOT <0.2 <0.2       No results for input(s): INR in the last 72 hours. No results for input(s): Susi Ibarra in the last 72 hours.     Chronic labs:    Lab Results   Component Value Date    CHOL 167 07/24/2020    TRIG 294 (H) 07/24/2020    HDL 28 07/24/2020    LDLCALC 80 07/24/2020    TSH 1.390 01/23/2022    INR 1.0 07/23/2020 LABA1C 5.9 (H) 07/24/2020       Radiology: REVIEWED DAILY    Assessment:  MERON - RESOLVED  Dehydration  Orthostatic hypotension  Seizure disorder  Hx of chronic hypoxic respiratory failure - RESOLVED Pt tolerates room air and no longer requires home oxygen  Depression    Plan:  Continue IVF  Repeat BMP, suspect contamination  Check orthos  Continue to hold home norvasc + lisinopril    DVT Prophylaxis [x] Lovenox  []  Heparin [] DOAC [] PCDs [] Ambulation    GI Prophylaxis [] PPI  [] H2 Blocker   [] Carafate  [x] Diet/Tube Feeds   Level of care [x] Med/Surg  [] Intermediate  []  ICU   Diet ADULT DIET; Regular    Family contact []  N/A    [] At bedside  [] Phone call     Discharge Plan: Home pending orthos and repeat BMP, potentially this afternoon    +++++++++++++++++++++++++++++++++++++++++++++++++  CRESCENCIO Deras/ [address]  +++++++++++++++++++++++++++++++++++++++++++++++++  NOTE: This report was transcribed using voice recognition software. Every effort was made to ensure accuracy; however, inadvertent computerized transcription errors may be present.

## 2022-09-29 NOTE — PROGRESS NOTES
Physical Therapy    Initial Assessment     Name: Janifer Goldmann  :   MRN: [id number]      Date of Service: 2022    Evaluating PT: Thomas Viera, PT, DPT [id number]      Room #:  8201/8201-A  Diagnosis:  Dehydration [E86.0]  PMHx/PSHx:  Seizures, brain tumor, back pain, marijuana use, herniated disc  Precautions:  Fall risk    SUBJECTIVE:    Pt lives with fiance in a 2 story house with 3 stair(s) and 2 rail(s) to enter. Bed and bath are on the first floor. Pt ambulated without AD prior to admission. Pt used [employer]. OBJECTIVE:   Initial Evaluation  Date: 22 Treatment Date: Short Term/ Long Term   Goals   AM-PAC 6 Clicks 39/57     Was pt agreeable to Eval/treatment? Yes     Does pt have pain? 6/10 chronic back pain     Bed Mobility  Rolling: NT  Supine to sit: Supervision  Sit to supine: Supervision  Scooting: Supervision to EOB  Rolling: Independent   Supine to sit: Independent   Sit to supine: Independent   Scooting: Independent    Transfers Sit to stand: SBA  Stand to sit: SBA  Stand pivot: SBA without AD  Sit to stand: Independent   Stand to sit: Independent   Stand pivot: Independent    Ambulation   100 feet without AD with SBA  >400 feet Independent    Stair negotiation: ascended and descended NT  >12 step(s) with 1 rail(s) Mod Independent    ROM BUE: Refer to OT note  BLE: WFL     Strength BUE: Refer to OT note  BLE: WFL     Balance Sitting EOB: Independent   Dynamic Standing: SBA without AD  Dynamic Standing: Independent      Pt is A & O x: 4 to person, place, month/year, and situation. Sensation: Pt reports chronic numbness and tingling of B LE. Edema: Unremarkable. Orthostatics Blood Pressure Heart Rate   Supine  126/82 mmHg 70 bpm   Sitting  111/91 mmHg 69 bpm   Standing 108/67 mmHg 78 bpm     Patient education  Pt educated on orthostatic hypotension.     Patient response to education:   Pt verbalized understanding Pt demonstrated skill Pt requires further education in this area Yes Yes Reinforce      ASSESSMENT:    Conditions Requiring Skilled Therapeutic Intervention:    [x]Decreased strength     []Decreased ROM  [x]Decreased functional mobility  [x]Decreased balance   []Decreased endurance   []Decreased posture  []Decreased sensation  []Decreased coordination   []Decreased vision  []Decreased safety awareness   [x]Increased pain       Comments:    Pt was in bed upon room entry; agreeable to PT evaluation. Orthostatics were taken and results are noted above. Pt denied dizziness throughout session. Pt ambulated in hallway. Gait speed was normal and pt had fair steadiness. Pt ambulated back to room. Pt was instructed on taking his time with all transfers to reduce dizziness/fall risk. All questions and concerns were addressed. Pt was left in bed with all needs met at conclusion of session. RN notified of orthostatic results. Treatment:  Patient practiced and was instructed in the following treatment:    Therapeutic activities:  Transfers: Pt was cued to move slowly/take rest breaks during sit <> stand transfers. Pt completed x3 transfers from EOB. Ambulation: Pt ambulated in hallway without AD. Pt was cued for safety and moving slowly. Vitals and symptoms were closely monitored throughout session. Orthostatics were taken. Pt's/family goals:  1. To return home. Prognosis is Good for reaching above PT goals. Patient and or family understand(s) diagnosis, prognosis, and plan of care. Yes. PHYSICAL THERAPY PLAN OF CARE:    PT POC is established based on physician order and patient diagnosis     Referring provider/PT Order:    Start   Ordering Provider    09/29/22 0015  PT eval and treat  Start:  09/29/22 0015,   End:  09/29/22 0015,   ONE TIME,   Standing Count:  1 Occurrences,   R         Aashish Square, DO      Diagnosis:  Dehydration [E86.0]  Specific instructions for next treatment:  Increase activity.     Current Treatment Recommendations:     [x] Strengthening to improve independence with functional mobility   [] ROM to improve independence with functional mobility   [x] Balance Training to improve static/dynamic balance and to reduce fall risk  [] Endurance Training to improve activity tolerance during functional mobility   [x] Transfer Training to improve safety and independence with all functional transfers   [x] Gait Training to improve gait mechanics, endurance and assess need for appropriate assistive device  [x] Stair Training in preparation for safe discharge home and/or into the community   [] Positioning to prevent skin breakdown and contractures  [x] Safety and Education Training   [] Patient/Caregiver Education   [] HEP  [] Other     PT long term treatment goals are located in above grid    Frequency of treatments: 2-5x/week x 1-2 days. Time in  0925  Time out  0950    Total Treatment Time  0 minutes     Evaluation Time includes thorough review of current medical information, gathering information on past medical history/social history and prior level of function, completion of standardized testing/informal observation of tasks, assessment of data and education on plan of care and goals.     CPT codes:  [x] Low Complexity PT evaluation 38986  [] Moderate Complexity PT evaluation 95658  [] High Complexity PT evaluation 95531  [] PT Re-evaluation 87938  [] Gait training 46351 0 minutes  [] Manual therapy 44952 0 minutes  [] Therapeutic activities 10636 0 minutes  [] Therapeutic exercises 73283 0 minutes  [] Neuromuscular reeducation 41501 0 minutes     Ileana Domínguez, PT, DPT  [id number]

## 2022-09-30 LAB — KEPPRA: 27 UG/ML (ref 10–40)

## 2022-10-28 PROBLEM — E86.0 DEHYDRATION: Status: RESOLVED | Noted: 2022-09-28 | Resolved: 2022-10-28

## 2022-11-24 ENCOUNTER — HOSPITAL ENCOUNTER (EMERGENCY)
Age: 67
Discharge: HOME | End: 2022-11-24
Attending: EMERGENCY MEDICINE
Payer: OTHER GOVERNMENT

## 2022-11-24 VITALS
BODY MASS INDEX: 19.74 KG/M2 | OXYGEN SATURATION: 99 % | SYSTOLIC BLOOD PRESSURE: 105 MMHG | HEIGHT: 71 IN | TEMPERATURE: 98.4 F | RESPIRATION RATE: 16 BRPM | DIASTOLIC BLOOD PRESSURE: 63 MMHG | HEART RATE: 82 BPM | WEIGHT: 141 LBS

## 2022-11-24 DIAGNOSIS — T40.601A OPIATE OVERDOSE, ACCIDENTAL OR UNINTENTIONAL, INITIAL ENCOUNTER (HCC): Primary | ICD-10-CM

## 2022-11-24 LAB
ANION GAP SERPL CALCULATED.3IONS-SCNC: 8 MMOL/L (ref 7–16)
BASOPHILS ABSOLUTE: 0.06 E9/L (ref 0–0.2)
BASOPHILS RELATIVE PERCENT: 0.5 % (ref 0–2)
BUN BLDV-MCNC: 17 MG/DL (ref 6–23)
CALCIUM SERPL-MCNC: 9.6 MG/DL (ref 8.6–10.2)
CHLORIDE BLD-SCNC: 104 MMOL/L (ref 98–107)
CO2: 26 MMOL/L (ref 22–29)
CREAT SERPL-MCNC: 1 MG/DL (ref 0.7–1.2)
EKG ATRIAL RATE: 77 BPM
EKG P AXIS: 78 DEGREES
EKG P-R INTERVAL: 152 MS
EKG Q-T INTERVAL: 390 MS
EKG QRS DURATION: 102 MS
EKG QTC CALCULATION (BAZETT): 441 MS
EKG R AXIS: 77 DEGREES
EKG T AXIS: 63 DEGREES
EKG VENTRICULAR RATE: 77 BPM
EOSINOPHILS ABSOLUTE: 0.21 E9/L (ref 0.05–0.5)
EOSINOPHILS RELATIVE PERCENT: 1.6 % (ref 0–6)
GFR SERPL CREATININE-BSD FRML MDRD: >60 ML/MIN/1.73
GLUCOSE BLD-MCNC: 116 MG/DL (ref 74–99)
HCT VFR BLD CALC: 44 % (ref 37–54)
HEMOGLOBIN: 14.1 G/DL (ref 12.5–16.5)
IMMATURE GRANULOCYTES #: 0.06 E9/L
IMMATURE GRANULOCYTES %: 0.5 % (ref 0–5)
LYMPHOCYTES ABSOLUTE: 0.81 E9/L (ref 1.5–4)
LYMPHOCYTES RELATIVE PERCENT: 6.1 % (ref 20–42)
MCH RBC QN AUTO: 29.4 PG (ref 26–35)
MCHC RBC AUTO-ENTMCNC: 32 % (ref 32–34.5)
MCV RBC AUTO: 91.7 FL (ref 80–99.9)
MONOCYTES ABSOLUTE: 0.53 E9/L (ref 0.1–0.95)
MONOCYTES RELATIVE PERCENT: 4 % (ref 2–12)
NEUTROPHILS ABSOLUTE: 11.65 E9/L (ref 1.8–7.3)
NEUTROPHILS RELATIVE PERCENT: 87.3 % (ref 43–80)
PDW BLD-RTO: 15.2 FL (ref 11.5–15)
PLATELET # BLD: 266 E9/L (ref 130–450)
PMV BLD AUTO: 9.3 FL (ref 7–12)
POTASSIUM SERPL-SCNC: 4.6 MMOL/L (ref 3.5–5)
RBC # BLD: 4.8 E12/L (ref 3.8–5.8)
REASON FOR REJECTION: NORMAL
REJECTED TEST: NORMAL
SODIUM BLD-SCNC: 138 MMOL/L (ref 132–146)
WBC # BLD: 13.3 E9/L (ref 4.5–11.5)

## 2022-11-24 PROCEDURE — 93005 ELECTROCARDIOGRAM TRACING: CPT | Performed by: STUDENT IN AN ORGANIZED HEALTH CARE EDUCATION/TRAINING PROGRAM

## 2022-11-24 PROCEDURE — 80048 BASIC METABOLIC PNL TOTAL CA: CPT

## 2022-11-24 PROCEDURE — 93010 ELECTROCARDIOGRAM REPORT: CPT | Performed by: INTERNAL MEDICINE

## 2022-11-24 PROCEDURE — 85025 COMPLETE CBC W/AUTO DIFF WBC: CPT

## 2022-11-24 PROCEDURE — 6370000000 HC RX 637 (ALT 250 FOR IP): Performed by: STUDENT IN AN ORGANIZED HEALTH CARE EDUCATION/TRAINING PROGRAM

## 2022-11-24 PROCEDURE — 99284 EMERGENCY DEPT VISIT MOD MDM: CPT

## 2022-11-24 RX ORDER — ACETAMINOPHEN 325 MG/1
650 TABLET ORAL ONCE
Status: COMPLETED | OUTPATIENT
Start: 2022-11-24 | End: 2022-11-24

## 2022-11-24 RX ADMIN — ACETAMINOPHEN 650 MG: 325 TABLET ORAL at 17:48

## 2022-11-24 ASSESSMENT — PAIN DESCRIPTION - LOCATION: LOCATION: HEAD

## 2022-11-24 ASSESSMENT — PAIN SCALES - GENERAL
PAINLEVEL_OUTOF10: 7
PAINLEVEL_OUTOF10: 1

## 2022-11-24 ASSESSMENT — ENCOUNTER SYMPTOMS
SINUS PRESSURE: 0
ANAL BLEEDING: 0
ABDOMINAL PAIN: 0
RHINORRHEA: 0
EYE DISCHARGE: 0
NAUSEA: 0
CHEST TIGHTNESS: 0
SHORTNESS OF BREATH: 0
BACK PAIN: 0
DIARRHEA: 0
SINUS PAIN: 0
ABDOMINAL DISTENTION: 0
VOMITING: 0
CONSTIPATION: 0
COUGH: 0

## 2022-11-24 ASSESSMENT — PAIN - FUNCTIONAL ASSESSMENT: PAIN_FUNCTIONAL_ASSESSMENT: NONE - DENIES PAIN

## 2022-11-24 ASSESSMENT — PAIN DESCRIPTION - DESCRIPTORS: DESCRIPTORS: ACHING

## 2022-11-24 NOTE — ED PROVIDER NOTES
HPI     Patient is a 79 y.o. male presents with a chief complaint of syncope  This has been occurring for a few hours. Patient states that it gets better with nothing. Patient states that it gets worse with nothing. Patient states that it is moderate in severity. Patient states it was acute in onset. Patient was found down by his roommate at which time EMS arrived and gave patient Narcan. Patient was initially unresponsive but is now responding. Patient denies any complaints at this time. Denies any chest pain or shortness of breath. Patient initially denies taking any drugs. Dad states that he smoked some crack earlier today. Patient then stated that he also scraped some white stuff off of a gloria that he found and smoked just prior to p.m. Narcan by EMS. Patient has no complaints at this time. Patient has a history of overdose secondary to heroin use. Denies any fevers, chills, nausea, vomiting, abdominal pain, change in urinary or bowel habits. Review of Systems   Constitutional:  Negative for activity change, appetite change, fatigue and fever. HENT:  Negative for congestion, rhinorrhea, sinus pressure and sinus pain. Eyes:  Negative for discharge. Respiratory:  Negative for cough, chest tightness and shortness of breath. Cardiovascular:  Negative for chest pain and palpitations. Gastrointestinal:  Negative for abdominal distention, abdominal pain, anal bleeding, constipation, diarrhea, nausea and vomiting. Endocrine: Negative for polydipsia and polyuria. Genitourinary:  Negative for decreased urine volume, difficulty urinating, enuresis, flank pain, frequency and urgency. Musculoskeletal:  Negative for arthralgias, back pain and neck stiffness. Skin:  Negative for rash and wound. Neurological:  Positive for syncope. Negative for dizziness, weakness, light-headedness and headaches. Psychiatric/Behavioral:  Negative for agitation, behavioral problems and confusion. Physical Exam  Vitals and nursing note reviewed. Constitutional:       Appearance: He is well-developed. HENT:      Head: Normocephalic and atraumatic. Eyes:      Conjunctiva/sclera: Conjunctivae normal.   Cardiovascular:      Rate and Rhythm: Normal rate and regular rhythm. Heart sounds: Normal heart sounds. No murmur heard. Pulmonary:      Effort: Pulmonary effort is normal. No respiratory distress. Breath sounds: Normal breath sounds. No wheezing or rales. Abdominal:      General: Bowel sounds are normal.      Palpations: Abdomen is soft. Tenderness: There is no abdominal tenderness. There is no guarding or rebound. Musculoskeletal:         General: No tenderness or deformity. Cervical back: Normal range of motion and neck supple. Skin:     General: Skin is warm and dry. Coloration: Skin is pale. Neurological:      Mental Status: He is alert and oriented to person, place, and time. Cranial Nerves: No cranial nerve deficit. Coordination: Coordination normal.        Procedures     [facility]       ED Course as of 11/24/22 1801   Thu Nov 24, 2022   1759 EKG read interpreted by me. Rate 77 bpm.  Normal axis. Normal WV interval.  Normal QRS. No ST elevations or depressions. PQRS concerning for ventricular hypertrophy. Compared to prior EKG with significant changes. [JM]      ED Course User Index  [JM] Patt Yarbrough MD      Patient is a 79 y.o. male presenting with accidental overdose. Patient is now asymptomatic. Patient was Narcan. Patient had labs drawn given the fact that he was pale. Normal hemoglobin. No chest pain or shortness of breath at any time. Patient admitted to taking drugs. Patient clinically appears well. Patient was observed on the monitor for over 3 hours past ingestion of medication. Patient will be discharged home. Patient has no suicidal or homicidal ideation. No hallucinations. EKG shows no signs of arrhythmia.     Patient was given return precautions. Labs were interpreted by me. Patient will follow up with their primary care provider. Patient is agreeable to this plan. Patient has remained stable throughout their stay in the ED. Patient was seen and evaluated by myself and my attending Deborah Viera DO. Assessment and Plan discussed with attending provider, please see attestation for final plan of care. This note was done using dictation software and there may be some grammatical errors associated with this. Matias Morris MD        ED Course as of 11/24/22 1915   Thu Nov 24, 2022   3504 EKG read interpreted by me. Rate 77 bpm.  Normal axis. Normal NM interval.  Normal QRS. No ST elevations or depressions. PQRS concerning for ventricular hypertrophy. Compared to prior EKG with significant changes. [JM]      ED Course User Index  [JM] Matias Morris MD       --------------------------------------------- PAST HISTORY ---------------------------------------------  Past Medical History:  has a past medical history of Back pain, chronic, Brain tumor [facility]), Chest pain, Herniated disc, Marijuana use, Seizure disorder ([facility] [address] Seizures ([facility] [address] Tobacco abuse, Tobacco abuse, and Unspecified cerebral artery occlusion with cerebral infarction. Past Surgical History:  has a past surgical history that includes Eye surgery (Bilateral); Tonsillectomy; External ear surgery; cervical fusion; and brain surgery. Social History:  reports that he has been smoking cigarettes. He has a 20.00 pack-year smoking history. He has never used smokeless tobacco. He reports that he does not drink alcohol and does not use drugs. Family History: family history includes Seizures in his son; Substance Abuse in his father. The patients home medications have been reviewed.     Allergies: Tramadol    -------------------------------------------------- RESULTS -------------------------------------------------  Labs:  Results for orders placed or performed during the hospital encounter of 11/24/22   BMP   Result Value Ref Range    Sodium 138 132 - 146 mmol/L    Potassium 4.6 3.5 - 5.0 mmol/L    Chloride 104 98 - 107 mmol/L    CO2 26 22 - 29 mmol/L    Anion Gap 8 7 - 16 mmol/L    Glucose 116 (H) 74 - 99 mg/dL    BUN 17 6 - 23 mg/dL    Creatinine 1.0 0.7 - 1.2 mg/dL    Est, Glom Filt Rate >60 >=60 mL/min/1.73    Calcium 9.6 8.6 - 10.2 mg/dL   SPECIMEN REJECTION   Result Value Ref Range    Rejected Test cbcwd     Reason for Rejection see below    CBC with Auto Differential   Result Value Ref Range    WBC 13.3 (H) 4.5 - 11.5 E9/L    RBC 4.80 3.80 - 5.80 E12/L    Hemoglobin 14.1 12.5 - 16.5 g/dL    Hematocrit 44.0 37.0 - 54.0 %    MCV 91.7 80.0 - 99.9 fL    MCH 29.4 26.0 - 35.0 pg    MCHC 32.0 32.0 - 34.5 %    RDW 15.2 (H) 11.5 - 15.0 fL    Platelets 756 679 - 679 E9/L    MPV 9.3 7.0 - 12.0 fL    Neutrophils % 87.3 (H) 43.0 - 80.0 %    Immature Granulocytes % 0.5 0.0 - 5.0 %    Lymphocytes % 6.1 (L) 20.0 - 42.0 %    Monocytes % 4.0 2.0 - 12.0 %    Eosinophils % 1.6 0.0 - 6.0 %    Basophils % 0.5 0.0 - 2.0 %    Neutrophils Absolute 11.65 (H) 1.80 - 7.30 E9/L    Immature Granulocytes # 0.06 E9/L    Lymphocytes Absolute 0.81 (L) 1.50 - 4.00 E9/L    Monocytes Absolute 0.53 0.10 - 0.95 E9/L    Eosinophils Absolute 0.21 0.05 - 0.50 E9/L    Basophils Absolute 0.06 0.00 - 0.20 E9/L   EKG 12 Lead   Result Value Ref Range    Ventricular Rate 77 BPM    Atrial Rate 77 BPM    P-R Interval 152 ms    QRS Duration 102 ms    Q-T Interval 390 ms    QTc Calculation (Bazett) 441 ms    P Axis 78 degrees    R Axis 77 degrees    T Axis 63 degrees       Radiology:  No orders to display       ------------------------- NURSING NOTES AND VITALS REVIEWED ---------------------------  Date / Time Roomed:  11/24/2022  4:33 PM  ED Bed Assignment:  09/09    The nursing notes within the ED encounter and vital signs as below have been reviewed.    /63   Pulse 82   Temp 98.4 °F (36.9 °C) (Oral)   Resp 16   Ht 5' 11\" (1.803 m)   Wt 141 lb (64 kg)   SpO2 99%   BMI 19.67 kg/m²   Oxygen Saturation Interpretation: Normal      ------------------------------------------ PROGRESS NOTES ------------------------------------------  7:15 PM EST  I have spoken with the patient and family if present and discussed todays results, in addition to providing specific details for the plan of care and counseling regarding the diagnosis and prognosis. Their questions are answered at this time and they are agreeable with the plan. I discussed at length with them reasons for immediate return here for re evaluation. They will followup with their primary care provider by calling their office as soon as possible.      --------------------------------- ADDITIONAL PROVIDER NOTES ---------------------------------  At this time the patient is without objective evidence of an acute process requiring hospitalization or inpatient management. They have remained hemodynamically stable throughout their entire ED visit and are stable for discharge with outpatient follow-up. The plan has been discussed in detail and they are aware of the specific conditions for emergent return, as well as the importance of follow-up. New Prescriptions    No medications on file       Diagnosis:  1. Opiate overdose, accidental or unintentional, initial encounter [facility])        Disposition:  Patient's disposition: Discharge to home  Patient's condition is stable.        Karthik Chan MD  Resident  11/24/22 8988

## 2024-05-13 ENCOUNTER — APPOINTMENT (OUTPATIENT)
Dept: GENERAL RADIOLOGY | Age: 69
End: 2024-05-13
Payer: OTHER GOVERNMENT

## 2024-05-13 ENCOUNTER — HOSPITAL ENCOUNTER (EMERGENCY)
Age: 69
Discharge: HOME | End: 2024-05-13
Payer: OTHER GOVERNMENT

## 2024-05-13 ENCOUNTER — APPOINTMENT (OUTPATIENT)
Dept: ULTRASOUND IMAGING | Age: 69
End: 2024-05-13
Payer: OTHER GOVERNMENT

## 2024-05-13 VITALS
DIASTOLIC BLOOD PRESSURE: 65 MMHG | WEIGHT: 140 LBS | BODY MASS INDEX: 19.6 KG/M2 | SYSTOLIC BLOOD PRESSURE: 114 MMHG | HEIGHT: 71 IN | RESPIRATION RATE: 20 BRPM | TEMPERATURE: 98.2 F | OXYGEN SATURATION: 98 % | HEART RATE: 76 BPM

## 2024-05-13 DIAGNOSIS — M89.8X5 PAIN OF LEFT FEMUR: ICD-10-CM

## 2024-05-13 DIAGNOSIS — M25.551 ACUTE RIGHT HIP PAIN: Primary | ICD-10-CM

## 2024-05-13 LAB
ALBUMIN SERPL-MCNC: 4 G/DL (ref 3.5–5.2)
ALP SERPL-CCNC: 89 U/L (ref 40–129)
ALT SERPL-CCNC: 17 U/L (ref 0–40)
ANION GAP SERPL CALCULATED.3IONS-SCNC: 11 MMOL/L (ref 7–16)
AST SERPL-CCNC: 16 U/L (ref 0–39)
BASOPHILS # BLD: 0.05 K/UL (ref 0–0.2)
BASOPHILS NFR BLD: 1 % (ref 0–2)
BILIRUB SERPL-MCNC: <0.2 MG/DL (ref 0–1.2)
BUN SERPL-MCNC: 26 MG/DL (ref 6–23)
CALCIUM SERPL-MCNC: 9.9 MG/DL (ref 8.6–10.2)
CHLORIDE SERPL-SCNC: 104 MMOL/L (ref 98–107)
CO2 SERPL-SCNC: 28 MMOL/L (ref 22–29)
CREAT SERPL-MCNC: 1.2 MG/DL (ref 0.7–1.2)
EOSINOPHIL # BLD: 0.29 K/UL (ref 0.05–0.5)
EOSINOPHILS RELATIVE PERCENT: 3 % (ref 0–6)
ERYTHROCYTE [DISTWIDTH] IN BLOOD BY AUTOMATED COUNT: 14.9 % (ref 11.5–15)
GFR, ESTIMATED: 67 ML/MIN/1.73M2
GLUCOSE SERPL-MCNC: 99 MG/DL (ref 74–99)
HCT VFR BLD AUTO: 40.2 % (ref 37–54)
HGB BLD-MCNC: 13.3 G/DL (ref 12.5–16.5)
IMM GRANULOCYTES # BLD AUTO: 0.03 K/UL (ref 0–0.58)
IMM GRANULOCYTES NFR BLD: 0 % (ref 0–5)
LYMPHOCYTES NFR BLD: 1.58 K/UL (ref 1.5–4)
LYMPHOCYTES RELATIVE PERCENT: 17 % (ref 20–42)
MCH RBC QN AUTO: 29.8 PG (ref 26–35)
MCHC RBC AUTO-ENTMCNC: 33.1 G/DL (ref 32–34.5)
MCV RBC AUTO: 89.9 FL (ref 80–99.9)
MONOCYTES NFR BLD: 0.58 K/UL (ref 0.1–0.95)
MONOCYTES NFR BLD: 6 % (ref 2–12)
NEUTROPHILS NFR BLD: 74 % (ref 43–80)
NEUTS SEG NFR BLD: 6.99 K/UL (ref 1.8–7.3)
PLATELET # BLD AUTO: 248 K/UL (ref 130–450)
PMV BLD AUTO: 9.4 FL (ref 7–12)
POTASSIUM SERPL-SCNC: 4.3 MMOL/L (ref 3.5–5)
PROT SERPL-MCNC: 5.8 G/DL (ref 6.4–8.3)
RBC # BLD AUTO: 4.47 M/UL (ref 3.8–5.8)
SODIUM SERPL-SCNC: 143 MMOL/L (ref 132–146)
WBC OTHER # BLD: 9.5 K/UL (ref 4.5–11.5)

## 2024-05-13 PROCEDURE — 72170 X-RAY EXAM OF PELVIS: CPT

## 2024-05-13 PROCEDURE — 6370000000 HC RX 637 (ALT 250 FOR IP): Performed by: NURSE PRACTITIONER

## 2024-05-13 PROCEDURE — 80053 COMPREHEN METABOLIC PANEL: CPT

## 2024-05-13 PROCEDURE — 85025 COMPLETE CBC W/AUTO DIFF WBC: CPT

## 2024-05-13 PROCEDURE — 96372 THER/PROPH/DIAG INJ SC/IM: CPT

## 2024-05-13 PROCEDURE — 93971 EXTREMITY STUDY: CPT

## 2024-05-13 PROCEDURE — 6360000002 HC RX W HCPCS: Performed by: NURSE PRACTITIONER

## 2024-05-13 PROCEDURE — 73502 X-RAY EXAM HIP UNI 2-3 VIEWS: CPT

## 2024-05-13 PROCEDURE — 73552 X-RAY EXAM OF FEMUR 2/>: CPT

## 2024-05-13 PROCEDURE — 99284 EMERGENCY DEPT VISIT MOD MDM: CPT

## 2024-05-13 RX ORDER — LIDOCAINE 50 MG/G
1 PATCH TOPICAL DAILY
Qty: 30 PATCH | Refills: 1 | Status: SHIPPED | OUTPATIENT
Start: 2024-05-13 | End: 2024-05-23

## 2024-05-13 RX ORDER — ORPHENADRINE CITRATE 30 MG/ML
60 INJECTION INTRAMUSCULAR; INTRAVENOUS ONCE
Status: COMPLETED | OUTPATIENT
Start: 2024-05-13 | End: 2024-05-13

## 2024-05-13 RX ORDER — PREDNISONE 20 MG/1
40 TABLET ORAL DAILY
Qty: 10 TABLET | Refills: 0 | Status: SHIPPED | OUTPATIENT
Start: 2024-05-13 | End: 2024-05-18

## 2024-05-13 RX ORDER — OXYCODONE HYDROCHLORIDE AND ACETAMINOPHEN 5; 325 MG/1; MG/1
1 TABLET ORAL ONCE
Status: COMPLETED | OUTPATIENT
Start: 2024-05-13 | End: 2024-05-13

## 2024-05-13 RX ORDER — TIZANIDINE 4 MG/1
4 TABLET ORAL 3 TIMES DAILY PRN
Qty: 30 TABLET | Refills: 1 | Status: SHIPPED | OUTPATIENT
Start: 2024-05-13

## 2024-05-13 RX ADMIN — ORPHENADRINE CITRATE 60 MG: 30 INJECTION, SOLUTION INTRAMUSCULAR; INTRAVENOUS at 13:41

## 2024-05-13 RX ADMIN — OXYCODONE HYDROCHLORIDE AND ACETAMINOPHEN 1 TABLET: 5; 325 TABLET ORAL at 13:40

## 2024-05-13 ASSESSMENT — PAIN SCALES - GENERAL
PAINLEVEL_OUTOF10: 10
PAINLEVEL_OUTOF10: 10

## 2024-05-13 ASSESSMENT — PAIN DESCRIPTION - LOCATION
LOCATION: HIP;LEG
LOCATION: HIP;KNEE

## 2024-05-13 ASSESSMENT — PAIN DESCRIPTION - DESCRIPTORS
DESCRIPTORS: SHARP;DISCOMFORT
DESCRIPTORS: ACHING;SHARP

## 2024-05-13 ASSESSMENT — PAIN DESCRIPTION - PAIN TYPE: TYPE: ACUTE PAIN

## 2024-05-13 ASSESSMENT — PAIN - FUNCTIONAL ASSESSMENT: PAIN_FUNCTIONAL_ASSESSMENT: 0-10

## 2024-05-13 ASSESSMENT — PAIN DESCRIPTION - ONSET: ONSET: ON-GOING

## 2024-05-13 ASSESSMENT — PAIN DESCRIPTION - ORIENTATION
ORIENTATION: LEFT
ORIENTATION: LEFT

## 2024-05-13 ASSESSMENT — PAIN DESCRIPTION - FREQUENCY: FREQUENCY: CONTINUOUS

## 2024-05-13 NOTE — ED PROVIDER NOTES
Independent [facility] Visit. HPI:  5/13/24, Time: 1:30 PM EDT         Gianluca Prince is a 68 y.o. male presenting to the ED for left hip pain femur pain and entire left hip pain, beginning 3 weeks ago.  The complaint has been intermittent, moderate in severity, and worsened by nothing.  Patient stated that he has had pain before. Not sure what it was but stated that he has been struggling to get treatment for his pain. He does have chronic lumbar spine pain but that is not what this is.    [facility] been [facility] to [facility] him with the pain. He has been using ibuprofen for the pain which does noting.     Denied fever chills trauma.  Nothing makes it better or worse. No positioning makes it better or worse.  No issues with bladder or bowel. No blood thinners.  No blockages in legs. Hx of smoking.   Accompanied by wife.  BP is low in triage less than 100. Reports that he is on bp meds but is not sure what he is on.  Fair historian.  Pain 7-9/10. Pain.     ROS:   Pertinent positives and negatives are stated within HPI, all other systems reviewed and are negative.  --------------------------------------------- PAST HISTORY ---------------------------------------------  Past Medical History:  has a past medical history of Back pain, chronic, Brain tumor (HCC), Chest pain, Herniated disc, Marijuana use, Seizure disorder (HCC), Seizures (HCC), Tobacco abuse, Tobacco abuse, and Unspecified cerebral artery occlusion with cerebral infarction.    Past Surgical History:  has a past surgical history that includes Eye surgery (Bilateral); Tonsillectomy; External ear surgery; cervical fusion; and brain surgery.    Social History:  reports that he has been smoking cigarettes. He has a 20.0 pack-year smoking history. He has never used smokeless tobacco. He reports that he does not drink alcohol and does not use drugs.    Family History: family history includes Seizures in his son; Substance Abuse

## 2025-07-25 ENCOUNTER — APPOINTMENT (OUTPATIENT)
Dept: GENERAL RADIOLOGY | Age: 70
End: 2025-07-25
Payer: OTHER GOVERNMENT

## 2025-07-25 ENCOUNTER — HOSPITAL ENCOUNTER (EMERGENCY)
Age: 70
Discharge: HOME | End: 2025-07-25
Payer: OTHER GOVERNMENT

## 2025-07-25 ENCOUNTER — APPOINTMENT (OUTPATIENT)
Dept: CT IMAGING | Age: 70
End: 2025-07-25
Payer: OTHER GOVERNMENT

## 2025-07-25 VITALS
BODY MASS INDEX: 19.6 KG/M2 | DIASTOLIC BLOOD PRESSURE: 57 MMHG | HEART RATE: 88 BPM | TEMPERATURE: 98.2 F | SYSTOLIC BLOOD PRESSURE: 103 MMHG | OXYGEN SATURATION: 97 % | RESPIRATION RATE: 18 BRPM | HEIGHT: 71 IN | WEIGHT: 140 LBS

## 2025-07-25 DIAGNOSIS — R10.31 ABDOMINAL PAIN, RIGHT LOWER QUADRANT: Primary | ICD-10-CM

## 2025-07-25 DIAGNOSIS — R05.1 ACUTE COUGH: ICD-10-CM

## 2025-07-25 DIAGNOSIS — J18.9 PNEUMONIA OF LEFT LOWER LOBE DUE TO INFECTIOUS ORGANISM: ICD-10-CM

## 2025-07-25 LAB
ALBUMIN SERPL-MCNC: 3.6 G/DL (ref 3.5–5.2)
ALP SERPL-CCNC: 94 U/L (ref 40–129)
ALT SERPL-CCNC: 12 U/L (ref 0–50)
ANION GAP SERPL CALCULATED.3IONS-SCNC: 11 MMOL/L (ref 7–16)
AST SERPL-CCNC: 13 U/L (ref 0–50)
BACTERIA URNS QL MICRO: ABNORMAL
BASOPHILS # BLD: 0.04 K/UL (ref 0–0.2)
BASOPHILS NFR BLD: 0 % (ref 0–2)
BILIRUB SERPL-MCNC: 0.2 MG/DL (ref 0–1.2)
BILIRUB UR QL STRIP: NEGATIVE
BUN SERPL-MCNC: 24 MG/DL (ref 8–23)
CALCIUM SERPL-MCNC: 9.8 MG/DL (ref 8.8–10.2)
CHLORIDE SERPL-SCNC: 102 MMOL/L (ref 98–107)
CLARITY UR: CLEAR
CO2 SERPL-SCNC: 26 MMOL/L (ref 22–29)
COLOR UR: YELLOW
CREAT SERPL-MCNC: 1 MG/DL (ref 0.7–1.2)
EOSINOPHIL # BLD: 0.08 K/UL (ref 0.05–0.5)
EOSINOPHILS RELATIVE PERCENT: 1 % (ref 0–6)
ERYTHROCYTE [DISTWIDTH] IN BLOOD BY AUTOMATED COUNT: 15.2 % (ref 11.5–15)
GFR, ESTIMATED: 79 ML/MIN/1.73M2
GLUCOSE SERPL-MCNC: 100 MG/DL (ref 74–99)
GLUCOSE UR STRIP-MCNC: NEGATIVE MG/DL
HCT VFR BLD AUTO: 35.4 % (ref 37–54)
HGB BLD-MCNC: 10.8 G/DL (ref 12.5–16.5)
HGB UR QL STRIP.AUTO: NEGATIVE
IMM GRANULOCYTES # BLD AUTO: 0.08 K/UL (ref 0–0.58)
IMM GRANULOCYTES NFR BLD: 1 % (ref 0–5)
KETONES UR STRIP-MCNC: NEGATIVE MG/DL
LACTATE BLDV-SCNC: 0.9 MMOL/L (ref 0.5–2.2)
LEUKOCYTE ESTERASE UR QL STRIP: NEGATIVE
LIPASE SERPL-CCNC: 22 U/L (ref 13–60)
LYMPHOCYTES NFR BLD: 1.18 K/UL (ref 1.5–4)
LYMPHOCYTES RELATIVE PERCENT: 8 % (ref 20–42)
MCH RBC QN AUTO: 29.4 PG (ref 26–35)
MCHC RBC AUTO-ENTMCNC: 30.5 G/DL (ref 32–34.5)
MCV RBC AUTO: 96.5 FL (ref 80–99.9)
MONOCYTES NFR BLD: 1.18 K/UL (ref 0.1–0.95)
MONOCYTES NFR BLD: 8 % (ref 2–12)
NEUTROPHILS NFR BLD: 84 % (ref 43–80)
NEUTS SEG NFR BLD: 13.27 K/UL (ref 1.8–7.3)
NITRITE UR QL STRIP: NEGATIVE
PH UR STRIP: 6 [PH] (ref 5–8)
PLATELET # BLD AUTO: 331 K/UL (ref 130–450)
PMV BLD AUTO: 9.1 FL (ref 7–12)
POTASSIUM SERPL-SCNC: 4.2 MMOL/L (ref 3.5–5.1)
PROT SERPL-MCNC: 6.2 G/DL (ref 6.4–8.3)
PROT UR STRIP-MCNC: 30 MG/DL
RBC # BLD AUTO: 3.67 M/UL (ref 3.8–5.8)
RBC #/AREA URNS HPF: ABNORMAL /HPF
SODIUM SERPL-SCNC: 140 MMOL/L (ref 136–145)
SP GR UR STRIP: 1.02 (ref 1–1.03)
UROBILINOGEN UR STRIP-ACNC: 1 EU/DL (ref 0–1)
WBC #/AREA URNS HPF: ABNORMAL /HPF
WBC OTHER # BLD: 15.8 K/UL (ref 4.5–11.5)

## 2025-07-25 PROCEDURE — 96375 TX/PRO/DX INJ NEW DRUG ADDON: CPT

## 2025-07-25 PROCEDURE — 80053 COMPREHEN METABOLIC PANEL: CPT

## 2025-07-25 PROCEDURE — 6360000004 HC RX CONTRAST MEDICATION: Performed by: RADIOLOGY

## 2025-07-25 PROCEDURE — 2580000003 HC RX 258: Performed by: PHYSICIAN ASSISTANT

## 2025-07-25 PROCEDURE — 81001 URINALYSIS AUTO W/SCOPE: CPT

## 2025-07-25 PROCEDURE — 74177 CT ABD & PELVIS W/CONTRAST: CPT

## 2025-07-25 PROCEDURE — 85025 COMPLETE CBC W/AUTO DIFF WBC: CPT

## 2025-07-25 PROCEDURE — 99285 EMERGENCY DEPT VISIT HI MDM: CPT

## 2025-07-25 PROCEDURE — 83690 ASSAY OF LIPASE: CPT

## 2025-07-25 PROCEDURE — 96374 THER/PROPH/DIAG INJ IV PUSH: CPT

## 2025-07-25 PROCEDURE — 71046 X-RAY EXAM CHEST 2 VIEWS: CPT

## 2025-07-25 PROCEDURE — 6360000002 HC RX W HCPCS: Performed by: PHYSICIAN ASSISTANT

## 2025-07-25 PROCEDURE — 83605 ASSAY OF LACTIC ACID: CPT

## 2025-07-25 RX ORDER — ONDANSETRON 2 MG/ML
4 INJECTION INTRAMUSCULAR; INTRAVENOUS ONCE
Status: COMPLETED | OUTPATIENT
Start: 2025-07-25 | End: 2025-07-25

## 2025-07-25 RX ORDER — DEXTROMETHORPHAN HYDROBROMIDE AND PROMETHAZINE HYDROCHLORIDE 15; 6.25 MG/5ML; MG/5ML
5 SYRUP ORAL 4 TIMES DAILY PRN
Qty: 240 ML | Refills: 1 | Status: SHIPPED | OUTPATIENT
Start: 2025-07-25

## 2025-07-25 RX ORDER — IOPAMIDOL 755 MG/ML
75 INJECTION, SOLUTION INTRAVASCULAR
Status: COMPLETED | OUTPATIENT
Start: 2025-07-25 | End: 2025-07-25

## 2025-07-25 RX ORDER — ONDANSETRON 4 MG/1
4 TABLET, ORALLY DISINTEGRATING ORAL EVERY 8 HOURS PRN
Qty: 30 TABLET | Refills: 0 | Status: SHIPPED | OUTPATIENT
Start: 2025-07-25

## 2025-07-25 RX ORDER — 0.9 % SODIUM CHLORIDE 0.9 %
1000 INTRAVENOUS SOLUTION INTRAVENOUS ONCE
Status: COMPLETED | OUTPATIENT
Start: 2025-07-25 | End: 2025-07-25

## 2025-07-25 RX ORDER — KETOROLAC TROMETHAMINE 15 MG/ML
15 INJECTION, SOLUTION INTRAMUSCULAR; INTRAVENOUS ONCE
Status: COMPLETED | OUTPATIENT
Start: 2025-07-25 | End: 2025-07-25

## 2025-07-25 RX ORDER — DOXYCYCLINE HYCLATE 100 MG
100 TABLET ORAL 2 TIMES DAILY
Qty: 20 TABLET | Refills: 0 | Status: SHIPPED | OUTPATIENT
Start: 2025-07-25 | End: 2025-08-04

## 2025-07-25 RX ADMIN — IOPAMIDOL 75 ML: 755 INJECTION, SOLUTION INTRAVENOUS at 12:50

## 2025-07-25 RX ADMIN — SODIUM CHLORIDE 1000 ML: 0.9 INJECTION, SOLUTION INTRAVENOUS at 12:09

## 2025-07-25 RX ADMIN — KETOROLAC TROMETHAMINE 15 MG: 15 INJECTION, SOLUTION INTRAMUSCULAR; INTRAVENOUS at 12:09

## 2025-07-25 RX ADMIN — ONDANSETRON 4 MG: 2 INJECTION, SOLUTION INTRAMUSCULAR; INTRAVENOUS at 12:09

## 2025-07-25 ASSESSMENT — PAIN DESCRIPTION - FREQUENCY: FREQUENCY: CONTINUOUS

## 2025-07-25 ASSESSMENT — PAIN DESCRIPTION - PAIN TYPE: TYPE: ACUTE PAIN

## 2025-07-25 ASSESSMENT — PAIN - FUNCTIONAL ASSESSMENT: PAIN_FUNCTIONAL_ASSESSMENT: 0-10

## 2025-07-25 ASSESSMENT — PAIN SCALES - GENERAL
PAINLEVEL_OUTOF10: 7
PAINLEVEL_OUTOF10: 7

## 2025-07-25 ASSESSMENT — PAIN DESCRIPTION - LOCATION: LOCATION: ABDOMEN

## 2025-07-25 NOTE — ED PROVIDER NOTES
Back pain, chronic     Brain tumor (HCC) unk    Chest pain 9/16/2014    Herniated disc     Marijuana use 9/16/2014    Seizure disorder (HCC)     Seizures (HCC)     Tobacco abuse     Tobacco abuse 9/16/2014    Unspecified cerebral artery occlusion with cerebral infarction      MEDICATIONS:   DISCHARGE MEDICATIONS:  New Prescriptions    DOXYCYCLINE HYCLATE (VIBRA-TABS) 100 MG TABLET    Take 1 tablet by mouth 2 times daily for 10 days    ONDANSETRON (ZOFRAN-ODT) 4 MG DISINTEGRATING TABLET    Take 1 tablet by mouth every 8 hours as needed for Nausea or Vomiting    PROMETHAZINE-DEXTROMETHORPHAN (PROMETHAZINE-DM) 6.25-15 MG/5ML SYRUP    Take 5 mLs by mouth 4 times daily as needed for Cough     DISCONTINUED MEDICATIONS:  Discontinued Medications    No medications on file     DIAGNOSIS:     1. Abdominal pain, right lower quadrant    2. Pneumonia of left lower lobe due to infectious organism    3. Acute cough      This patient's ED course included: a personal history and physicial examination and re-evaluation prior to disposition  This patient has remained hemodynamically stable during their ED course.    DISPOSITION:   Discharge to home.  Patient condition is good.    Discharge Instructions:   Patient referred to  Christin Hernandez, APRN - CNP  [address]  [place] [address]  [phone]    Call today  for follow-up on ED visit    Nearest ED    Go to   as needed, if symptoms worsen      Electronically signed by eHnna Darden PA-C   DD: 7/25/25  I am the Primary Clinician of Record.    **This report was transcribed using voice recognition software. Every effort was made to ensure accuracy; however, inadvertent computerized transcription errors may be present.    END OF PROVIDER NOTE        Henna Darden PA-C  07/25/25 9474

## 2025-07-25 NOTE — DISCHARGE INSTRUCTIONS
XR CHEST (2 VW)   Final Result   Left costophrenic angle pneumonia..         CT ABDOMEN PELVIS W IV CONTRAST Additional Contrast? None   Final Result   1. Left lower lobe pneumonia in very small left effusion.   2. Minimal sigmoid diverticulosis but no evidence of diverticulitis.   3. Bilateral nonobstructive nephrolithiasis.   4. Prostatomegaly.           Return to emergency department with any increased or persistent abdominal pain or new symptoms